# Patient Record
Sex: FEMALE | Race: WHITE | NOT HISPANIC OR LATINO | Employment: FULL TIME | URBAN - METROPOLITAN AREA
[De-identification: names, ages, dates, MRNs, and addresses within clinical notes are randomized per-mention and may not be internally consistent; named-entity substitution may affect disease eponyms.]

---

## 2017-01-09 ENCOUNTER — RADIANT APPOINTMENT (OUTPATIENT)
Dept: MAMMOGRAPHY | Facility: CLINIC | Age: 52
End: 2017-01-09
Payer: COMMERCIAL

## 2017-01-09 ENCOUNTER — OFFICE VISIT (OUTPATIENT)
Dept: OBGYN | Facility: CLINIC | Age: 52
End: 2017-01-09
Payer: COMMERCIAL

## 2017-01-09 VITALS
WEIGHT: 130 LBS | DIASTOLIC BLOOD PRESSURE: 58 MMHG | BODY MASS INDEX: 21.66 KG/M2 | HEIGHT: 65 IN | HEART RATE: 60 BPM | SYSTOLIC BLOOD PRESSURE: 100 MMHG

## 2017-01-09 DIAGNOSIS — Z12.31 VISIT FOR SCREENING MAMMOGRAM: ICD-10-CM

## 2017-01-09 DIAGNOSIS — Z79.890 POSTMENOPAUSAL HRT (HORMONE REPLACEMENT THERAPY): ICD-10-CM

## 2017-01-09 DIAGNOSIS — Z01.419 ENCOUNTER FOR GYNECOLOGICAL EXAMINATION WITHOUT ABNORMAL FINDING: Primary | ICD-10-CM

## 2017-01-09 PROCEDURE — 77063 BREAST TOMOSYNTHESIS BI: CPT | Mod: TC

## 2017-01-09 PROCEDURE — 99396 PREV VISIT EST AGE 40-64: CPT | Performed by: OBSTETRICS & GYNECOLOGY

## 2017-01-09 PROCEDURE — G0202 SCR MAMMO BI INCL CAD: HCPCS | Mod: TC

## 2017-01-09 ASSESSMENT — ANXIETY QUESTIONNAIRES
3. WORRYING TOO MUCH ABOUT DIFFERENT THINGS: NOT AT ALL
5. BEING SO RESTLESS THAT IT IS HARD TO SIT STILL: NOT AT ALL
GAD7 TOTAL SCORE: 0
1. FEELING NERVOUS, ANXIOUS, OR ON EDGE: NOT AT ALL
2. NOT BEING ABLE TO STOP OR CONTROL WORRYING: NOT AT ALL
6. BECOMING EASILY ANNOYED OR IRRITABLE: NOT AT ALL
7. FEELING AFRAID AS IF SOMETHING AWFUL MIGHT HAPPEN: NOT AT ALL

## 2017-01-09 ASSESSMENT — PATIENT HEALTH QUESTIONNAIRE - PHQ9: 5. POOR APPETITE OR OVEREATING: NOT AT ALL

## 2017-01-09 NOTE — MR AVS SNAPSHOT
"              After Visit Summary   2017    Gary Bowers    MRN: [id number]           Patient Information     Date Of Birth          1965        Visit Information        Provider Department      2017 3:00 PM Elizabeth Dominguez MD [facility]        Today's Diagnoses     Encounter for gynecological examination without abnormal finding [Z01.419]    -  1     Postmenopausal HRT (hormone replacement therapy)            Follow-ups after your visit        Who to contact     If you have questions or need follow up information about today's clinic visit or your schedule please contact [facility] directly at [phone].  Normal or non-critical lab and imaging results will be communicated to you by indicohart, letter or phone within 4 business days after the clinic has received the results. If you do not hear from us within 7 days, please contact the clinic through indicohart or phone. If you have a critical or abnormal lab result, we will notify you by phone as soon as possible.  Submit refill requests through PlasmaSi or call your pharmacy and they will forward the refill request to us. Please allow 3 business days for your refill to be completed.          Additional Information About Your Visit        MyChart Information     PlasmaSi lets you send messages to your doctor, view your test results, renew your prescriptions, schedule appointments and more. To sign up, go to [email or web address] . Click on \"Log in\" on the left side of the screen, which will take you to the Welcome page. Then click on \"Sign up Now\" on the right side of the page.     You will be asked to enter the access code listed below, as well as some personal information. Please follow the directions to create your username and password.     Your access code is: [email or web address]  Expires: 2017 11:48 AM     Your access code will  in 90 days. If you need help or a new code, please call your [place] " "clinic or [phone].        Care EveryWhere ID     This is your Care EveryWhere ID. This could be used by other organizations to access your [facility] medical records  [id number]        Your Vitals Were     Pulse Height BMI (Body Mass Index) Last Period Breastfeeding?       60 5' 4.5\" (1.638 m) 21.98 kg/m2 04/01/2012 No        Blood Pressure from Last 3 Encounters:   01/09/17 100/58   12/02/16 104/54   11/21/16 116/70    Weight from Last 3 Encounters:   01/09/17 130 lb (58.968 kg)   12/02/16 130 lb (58.968 kg)   11/21/16 129 lb 5 oz (58.656 kg)              Today, you had the following     No orders found for display         Today's Medication Changes          These changes are accurate as of: 1/9/17  4:14 PM.  If you have any questions, ask your nurse or doctor.               These medicines have changed or have updated prescriptions.        Dose/Directions    estrogen (conjugated)-medroxyPROGESTERone 0.3-1.5 MG per tablet   Commonly known as:  PREMPRO   This may have changed:  See the new instructions.   Used for:  Postmenopausal HRT (hormone replacement therapy)   Changed by:  Elizabeth Dominguez MD        Dose:  1 tablet   Take 1 tablet by mouth daily   Quantity:  90 tablet   Refills:  3            Where to get your medicines      These medications were sent to [facility] [id number] [facility] - [place], [place] - [address]  [address] [facility] [address], [place] [address]     Phone:  [phone]    - estrogen (conjugated)-medroxyPROGESTERone 0.3-1.5 MG per tablet             Primary Care Provider Office Phone # Fax #    Claudine Scott [phone]       [address] [address]        Thank you!     Thank you for choosing [facility]  for your care. Our goal is always to provide you with excellent care. Hearing back from our patients is one way we can continue to improve our services. Please take a few minutes to complete the written " survey that you may receive in the mail after your visit with us. Thank you!             Your Updated Medication List - Protect others around you: Learn how to safely use, store and throw away your medicines at www.disposemymeds.org.          This list is accurate as of: 1/9/17  4:14 PM.  Always use your most recent med list.                   Brand Name Dispense Instructions for use    estrogen (conjugated)-medroxyPROGESTERone 0.3-1.5 MG per tablet    PREMPRO    90 tablet    Take 1 tablet by mouth daily

## 2017-01-09 NOTE — PROGRESS NOTES
Gary is a 51 year old  female who presents for annual exam.     Besides routine health maintenance, she has no other health concerns today .    HPI:  The patient's PCP is Claudine Scott MD.    Patient wants to continue prempro, we plan to wean off in a few yrs  No other concerns  Due for colonoscopy      GYNECOLOGIC HISTORY:    Patient's last menstrual period was 2012.  Her current contraception method is: menopause.  She  reports that she has never smoked. She has never used smokeless tobacco.    Patient is sexually active.  STD testing offered?  Declined  Last PHQ-9 score on record =   PHQ-9 SCORE 2017   Total Score 0     Last GAD7 score on record =   LELE-7 SCORE 2017   Total Score 0     Alcohol Score = 3    HEALTH MAINTENANCE:  Cholesterol: (No results found for: CHOL   Last Mammo: 2014, Result: normal, Next Mammo: today   Pap: 2013 WNL HPV-  Colonoscopy:  NA, Result: not applicable, Next Colonoscopy: This year.  Dexa:  NA    Health maintenance updated:  yes    HISTORY:  Obstetric History       T2      TAB0   SAB0   E0   M0   L4       # Outcome Date GA Lbr Alexi/2nd Weight Sex Delivery Anes PTL Lv   2 Term 04 37w0d  7 lb 2 oz (3.232 kg) M    Y      Name: CATHI   1 Term 97   6 lb 10 oz (3.005 kg) F    Y      Name: ELENOR      Obstetric Comments    x 2 without complications       Patient Active Problem List   Diagnosis     CARDIOVASCULAR SCREENING; LDL GOAL LESS THAN 160     Past Surgical History   Procedure Laterality Date     Hc removal of tonsils,12+ y/o       Tonsils 12+y.o.     [place] teeth extracted       Biopsy/excision lymph node needle superficial  GROIN NODE      Social History   Substance Use Topics     Smoking status: Never Smoker      Smokeless tobacco: Never Used     Alcohol Use: 0.0 oz/week     0 Standard drinks or equivalent per week      Comment: 1 glass of wine or 2 /week       Problem (# of Occurrences) Relation  "(Name,Age of Onset)    Breast Cancer (1) Maternal Grandmother    DIABETES (2) Paternal Grandmother: type 2 , Maternal Aunt:  of complications in her mid 30's - type 1     HEART DISEASE (1) Father (54): MI in his mid 50's s/p CABG x 2             Current Outpatient Prescriptions   Medication Sig     estrogen, conjugated,-medroxyPROGESTERone (PREMPRO) 0.3-1.5 MG per tablet Take 1 tablet by mouth daily     [DISCONTINUED] PREMPRO 0.3-1.5 MG per tablet TAKE ONE TABLET BY MOUTH ONE TIME DAILY     No current facility-administered medications for this visit.     Allergies   Allergen Reactions     Amoxicillin GI Disturbance     Penicillins        Past medical, surgical, social and family histories were reviewed and updated in EPIC.    ROS:   12 point review of systems negative other than symptoms noted below.    EXAM:  /58 mmHg  Pulse 60  Ht 5' 4.5\" (1.638 m)  Wt 130 lb (58.968 kg)  BMI 21.98 kg/m2  LMP 2012  Breastfeeding? No   BMI: Body mass index is 21.98 kg/(m^2).    PHYSICAL EXAM:  Constitutional:  Appearance: Well nourished, well developed, alert, in no acute distress  Neck:  Lymph Nodes:  No lymphadenopathy present    Thyroid:  Gland size normal, nontender, no nodules or masses present  on palpation  Chest:  Respiratory Effort:  Breathing unlabored  Cardiovascular:    Heart: Auscultation:  Regular rate, normal rhythm, no murmurs present  Breasts: Inspection of Breasts:  No lymphadenopathy present    Palpation of Breasts and Axillae:  No masses present on palpation, no  breast tenderness    Axillary Lymph Nodes:  No lymphadenopathy present  Gastrointestinal:   Abdominal Examination:  Abdomen nontender to palpation, tone normal without rigidity or guarding, no masses present, umbilicus without lesions   Liver and Spleen:  No hepatomegaly present, liver nontender to palpation    Hernias:  No hernias present  Lymphatic: Lymph Nodes:  No other lymphadenopathy present  Skin:  General Inspection:  No " rashes present, no lesions present, no areas of  discoloration    Genitalia and Groin:  No rashes present, no lesions present, no areas of  discoloration, no masses present  Neurologic/Psychiatric:    Mental Status:  Oriented X3     Pelvic Exam:  External Genitalia:     Normal appearance for age, no discharge present, no tenderness present, no inflammatory lesions present, color normal  Vagina:     Normal vaginal vault without central or paravaginal defects, no discharge present, no inflammatory lesions present, no masses present  Bladder:     Nontender to palpation  Urethra:   Urethral Body:  Urethra palpation normal, urethra structural support normal   Urethral Meatus:  No erythema or lesions present  Cervix:     Appearance healthy, no lesions present, nontender to palpation, no bleeding present  Uterus:     Nontender to palpation, no masses present, position anteflexed, mobility: normal  Adnexa:     No adnexal tenderness present, no adnexal masses present  Perineum:     Perineum within normal limits, no evidence of trauma, no rashes or skin lesions present  Anus:     Anus within normal limits, no hemorrhoids present  Inguinal Lymph Nodes:     No lymphadenopathy present  Pubic Hair:     Normal pubic hair distribution for age  Genitalia and Groin:     No rashes present, no lesions present, no areas of discoloration, no masses present    COUNSELING:   Reviewed preventive health counseling, as reflected in patient instructions       Regular exercise       Healthy diet/nutrition    BMI: Body mass index is 21.98 kg/(m^2).      ASSESSMENT:  51 year old female with satisfactory annual exam.    ICD-10-CM    1. Encounter for gynecological examination without abnormal finding [Z01.419] Z01.419    2. Postmenopausal HRT (hormone replacement therapy) Z79.890 estrogen, conjugated,-medroxyPROGESTERone (PREMPRO) 0.3-1.5 MG per tablet       PLAN:  Return 1 years  rec 3D mammos  Not due for pap this yr    Elizabeth Dominguez MD

## 2017-01-10 ASSESSMENT — PATIENT HEALTH QUESTIONNAIRE - PHQ9: SUM OF ALL RESPONSES TO PHQ QUESTIONS 1-9: 0

## 2017-01-10 ASSESSMENT — ANXIETY QUESTIONNAIRES: GAD7 TOTAL SCORE: 0

## 2018-01-24 DIAGNOSIS — Z79.890 POSTMENOPAUSAL HRT (HORMONE REPLACEMENT THERAPY): ICD-10-CM

## 2018-01-24 RX ORDER — ESTROGEN,CON/M-PROGEST ACET 0.3-1.5MG
TABLET ORAL
Qty: 28 TABLET | Refills: 0 | Status: SHIPPED | OUTPATIENT
Start: 2018-01-24 | End: 2018-02-28

## 2018-01-24 NOTE — TELEPHONE ENCOUNTER
estrogen, conjugated,-medroxyPROGESTERone (PREMPRO) 0.3-1.5 MG per tablet    Last Written Prescription Date:  1/9/17  Last Fill Quantity: 90,   # refills: 3  Last Office Visit with [facility] primary care provider:  1/9/17  Future Office visit: none    Routing refill request to provider for review/approval because:  Pt due for annual, no appointment made. One month supply sent.

## 2018-02-02 DIAGNOSIS — Z79.890 POSTMENOPAUSAL HRT (HORMONE REPLACEMENT THERAPY): ICD-10-CM

## 2018-02-05 RX ORDER — ESTROGEN,CON/M-PROGEST ACET 0.3-1.5MG
TABLET ORAL
Qty: 84 TABLET | Refills: 3 | OUTPATIENT
Start: 2018-02-05

## 2018-02-05 NOTE — TELEPHONE ENCOUNTER
PREMPRO 0.3-1.5      Last Written Prescription Date:  1/24/18  Last Fill Quantity: 28,   # refills: 0  Last Office Visit: 1/9/17  Future Office visit:   none    Pt due for annual, no appt scheduled. Pt already received one month extension. Rx denied.

## 2018-02-28 DIAGNOSIS — Z79.890 POSTMENOPAUSAL HRT (HORMONE REPLACEMENT THERAPY): ICD-10-CM

## 2018-02-28 RX ORDER — ESTROGEN,CON/M-PROGEST ACET 0.3-1.5MG
TABLET ORAL
Qty: 28 TABLET | Refills: 0 | Status: SHIPPED | OUTPATIENT
Start: 2018-02-28 | End: 2018-03-30

## 2018-02-28 NOTE — TELEPHONE ENCOUNTER
PREMPRO 0.3-1.5      Last Written Prescription Date:  1/24/18  Last Fill Quantity: 28,   # refills: 0  Last Office Visit: 1/9/17  Future Office visit:   3/26/18    Pt has appointment Refill sent.

## 2018-03-26 ENCOUNTER — RADIANT APPOINTMENT (OUTPATIENT)
Dept: MAMMOGRAPHY | Facility: CLINIC | Age: 53
End: 2018-03-26
Payer: COMMERCIAL

## 2018-03-26 ENCOUNTER — OFFICE VISIT (OUTPATIENT)
Dept: OBGYN | Facility: CLINIC | Age: 53
End: 2018-03-26
Payer: COMMERCIAL

## 2018-03-26 VITALS
HEIGHT: 65 IN | WEIGHT: 128 LBS | DIASTOLIC BLOOD PRESSURE: 52 MMHG | BODY MASS INDEX: 21.33 KG/M2 | SYSTOLIC BLOOD PRESSURE: 90 MMHG

## 2018-03-26 DIAGNOSIS — Z13.6 SCREENING FOR CARDIOVASCULAR CONDITION: ICD-10-CM

## 2018-03-26 DIAGNOSIS — Z01.419 ENCOUNTER FOR GYNECOLOGICAL EXAMINATION WITHOUT ABNORMAL FINDING: Primary | ICD-10-CM

## 2018-03-26 DIAGNOSIS — M81.0 OSTEOPOROSIS, SENILE: ICD-10-CM

## 2018-03-26 DIAGNOSIS — Z12.11 SCREEN FOR COLON CANCER: ICD-10-CM

## 2018-03-26 DIAGNOSIS — Z13.228 SCREENING FOR METABOLIC DISORDER: ICD-10-CM

## 2018-03-26 DIAGNOSIS — Z12.31 VISIT FOR SCREENING MAMMOGRAM: ICD-10-CM

## 2018-03-26 PROCEDURE — G0145 SCR C/V CYTO,THINLAYER,RESCR: HCPCS | Performed by: OBSTETRICS & GYNECOLOGY

## 2018-03-26 PROCEDURE — 99396 PREV VISIT EST AGE 40-64: CPT | Performed by: OBSTETRICS & GYNECOLOGY

## 2018-03-26 PROCEDURE — 99213 OFFICE O/P EST LOW 20 MIN: CPT | Mod: 25 | Performed by: OBSTETRICS & GYNECOLOGY

## 2018-03-26 PROCEDURE — 77067 SCR MAMMO BI INCL CAD: CPT | Mod: TC

## 2018-03-26 PROCEDURE — 87624 HPV HI-RISK TYP POOLED RSLT: CPT | Performed by: OBSTETRICS & GYNECOLOGY

## 2018-03-26 ASSESSMENT — ANXIETY QUESTIONNAIRES
2. NOT BEING ABLE TO STOP OR CONTROL WORRYING: NOT AT ALL
IF YOU CHECKED OFF ANY PROBLEMS ON THIS QUESTIONNAIRE, HOW DIFFICULT HAVE THESE PROBLEMS MADE IT FOR YOU TO DO YOUR WORK, TAKE CARE OF THINGS AT HOME, OR GET ALONG WITH OTHER PEOPLE: NOT DIFFICULT AT ALL
7. FEELING AFRAID AS IF SOMETHING AWFUL MIGHT HAPPEN: NOT AT ALL
3. WORRYING TOO MUCH ABOUT DIFFERENT THINGS: NOT AT ALL
6. BECOMING EASILY ANNOYED OR IRRITABLE: NOT AT ALL
1. FEELING NERVOUS, ANXIOUS, OR ON EDGE: NOT AT ALL
5. BEING SO RESTLESS THAT IT IS HARD TO SIT STILL: NOT AT ALL
GAD7 TOTAL SCORE: 0

## 2018-03-26 ASSESSMENT — PATIENT HEALTH QUESTIONNAIRE - PHQ9: 5. POOR APPETITE OR OVEREATING: NOT AT ALL

## 2018-03-26 NOTE — MR AVS SNAPSHOT
After Visit Summary   3/26/2018    Gary Bowers    MRN: [id number]           Patient Information     Date Of Birth          1965        Visit Information        Provider Department      3/26/2018 11:30 AM Elizabeth Dominguez MD [facility]        Today's Diagnoses     Encounter for gynecological examination without abnormal finding    -  1    Screening for cardiovascular condition        Screening for metabolic disorder        Screen for colon cancer        Osteoporosis, senile           Follow-ups after your visit        Your next 10 appointments already scheduled     Apr 02, 2018  8:30 AM CDT   LAB with [facility]   [facility] ([facility] [place])    [address] [address]  [place] [address]   [phone]           Please do not eat 10-12 hours before your appointment if you are coming in fasting for labs on lipids, cholesterol, or glucose (sugar). This does not apply to pregnant women. Water, hot tea and black coffee (with nothing added) are okay. Do not drink other fluids, diet soda or chew gum.            Apr 02, 2018  8:45 AM CDT   DX HIP/PELVIS/SPINE with WEDEXA1   [facility] ([facility] [place])    [address] [address]   [phone]           Please do not take any of the following 24 hours prior to the day of your exam: vitamins, calcium tablets, antacids.  If possible, please wear clothes without metal (snaps, zippers). A sweatsuit works well.              Who to contact     If you have questions or need follow up information about today's clinic visit or your schedule please contact [facility] directly at [phone].  Normal or non-critical lab and imaging results will be communicated to you by MyChart, letter or phone within 4 business days after the clinic has received the results. If you do not hear from us within 7 days,  "please contact the clinic through evOLED or phone. If you have a critical or abnormal lab result, we will notify you by phone as soon as possible.  Submit refill requests through evOLED or call your pharmacy and they will forward the refill request to us. Please allow 3 business days for your refill to be completed.          Additional Information About Your Visit        LightSquaredharVaybee Information     evOLED lets you send messages to your doctor, view your test results, renew your prescriptions, schedule appointments and more. To sign up, go to [email or web address] . Click on \"Log in\" on the left side of the screen, which will take you to the Welcome page. Then click on \"Sign up Now\" on the right side of the page.     You will be asked to enter the access code listed below, as well as some personal information. Please follow the directions to create your username and password.     Your access code is: [email or web address]  Expires: 2018 12:45 PM     Your access code will  in 90 days. If you need help or a new code, please call your [place] clinic or [phone].        Care EveryWhere ID     This is your Care EveryWhere ID. This could be used by other organizations to access your [place] medical records  [id number]        Your Vitals Were     Height Last Period BMI (Body Mass Index)             5' 4.5\" (1.638 m) 2012 21.63 kg/m2          Blood Pressure from Last 3 Encounters:   18 90/52   17 100/58   16 104/54    Weight from Last 3 Encounters:   18 128 lb (58.1 kg)   17 130 lb (59 kg)   16 130 lb (59 kg)              Today, you had the following     No orders found for display       Primary Care Provider Office Phone # Fax #    Claudine Scott [phone] [address] [facility] [address]        Equal Access to Services     ARNAV ALMONTE AH: Emilee Ramirez, adalberto locke, qaybrusty soto " karina ruizjayrorosana gordonDonaldnoreen ana. So [facility] [phone].    ATENCIÓN: Si habla ene, tiene a johnson disposición servicios gratuitos de asistencia lingüística. Hilaria al [phone].    We comply with applicable federal civil rights laws and [place] laws. We do not discriminate on the basis of race, color, national origin, age, disability, sex, sexual orientation, or gender identity.            Thank you!     Thank you for choosing [facility] [place]  for your care. Our goal is always to provide you with excellent care. Hearing back from our patients is one way we can continue to improve our services. Please take a few minutes to complete the written survey that you may receive in the mail after your visit with us. Thank you!             Your Updated Medication List - Protect others around you: Learn how to safely use, store and throw away your medicines at www.disposemymeds.org.          This list is accurate as of 3/26/18 12:46 PM.  Always use your most recent med list.                   Brand Name Dispense Instructions for use Diagnosis    PREMPRO 0.3-1.5 MG per tablet   Generic drug:  estrogen (conjugated)-medroxyPROGESTERone     28 tablet    TAKE 1 TABLET BY MOUTH DAILY    Postmenopausal HRT (hormone replacement therapy)

## 2018-03-26 NOTE — PROGRESS NOTES
Gary is a 53 year old  female who presents for annual exam.     Besides routine health maintenance, she has no other health concerns today .    HPI:  The patient has no PCP on file.   Former patient of Dr Phillip  Had osteoporosis on dexa in 2013  -3.1 in her spine but hips were still mild, not on meds  Takes some vit d  No history of fractures  Lifts weight and runs for exercise  Denies bone pain          GYNECOLOGIC HISTORY:    Patient's last menstrual period was 2012.  Her current contraception method is: menopause.  She  reports that she has never smoked. She has never used smokeless tobacco.    Patient is sexually active.  STD testing offered?  Declined  Last PHQ-9 score on record =   PHQ-9 SCORE 3/26/2018   Total Score 0     Last GAD7 score on record =   LELE-7 SCORE 3/26/2018   Total Score 0     Alcohol Score = 3    HEALTH MAINTENANCE:  Cholesterol:   Last Mammo: 17, Result: normal, Next Mammo: today   Pap: 13 NIL HPV Neg  Colonoscopy:  Never, Result: not applicable, Next Colonoscopy: Will place referall  Dexa:  13   Osteoporosis in spine   -3.1    Health maintenance updated:  yes    HISTORY:  Obstetric History       T2      L4     SAB0   TAB0   Ectopic0   Multiple0   Live Births2       # Outcome Date GA Lbr Alexi/2nd Weight Sex Delivery Anes PTL Lv   2 Term 04 37w0d  7 lb 2 oz (3.232 kg) M    GINA      Name: CATHI   1 Term 97   6 lb 10 oz (3.005 kg) F    GINA      Name: LORI      Obstetric Comments    x 2 without complications       Patient Active Problem List   Diagnosis     CARDIOVASCULAR SCREENING; LDL GOAL LESS THAN 160     Past Surgical History:   Procedure Laterality Date     BIOPSY/EXCISION LYMPH NODE NEEDLE SUPERFICIAL  GROIN NODE     HC REMOVAL OF TONSILS,12+ Y/O      Tonsils 12+y.o.     [place] teeth extracted        Social History   Substance Use Topics     Smoking status: Never Smoker     Smokeless tobacco: Never Used     Alcohol use  "0.0 oz/week     0 Standard drinks or equivalent per week      Comment: 1 glass of wine or 2 /week       Problem (# of Occurrences) Relation (Name,Age of Onset)    Breast Cancer (1) Maternal Grandmother    DIABETES (2) Paternal Grandmother: type 2 , Maternal Aunt:  of complications in her mid 30's - type 1     HEART DISEASE (1) Father (54): MI in his mid 50's s/p CABG x 2             Current Outpatient Prescriptions   Medication Sig     PREMPRO 0.3-1.5 MG per tablet TAKE 1 TABLET BY MOUTH DAILY     No current facility-administered medications for this visit.      Allergies   Allergen Reactions     Amoxicillin GI Disturbance     Penicillins        Past medical, surgical, social and family histories were reviewed and updated in EPIC.    ROS:   12 point review of systems negative other than symptoms noted below.  Gastrointestinal: Blood in Stools    EXAM:  BP 90/52  Ht 5' 4.5\" (1.638 m)  Wt 128 lb (58.1 kg)  LMP 2012  BMI 21.63 kg/m2   BMI: Body mass index is 21.63 kg/(m^2).    PHYSICAL EXAM:  Constitutional:  Appearance: Well nourished, well developed, alert, in no acute distress  Neck:  Lymph Nodes:  No lymphadenopathy present    Thyroid:  Gland size normal, nontender, no nodules or masses present  on palpation  Chest:  Respiratory Effort:  Breathing unlabored  Cardiovascular:    Heart: Auscultation:  Regular rate, normal rhythm, no murmurs present  Breasts: Inspection of Breasts:  No lymphadenopathy present., Palpation of Breasts and Axillae:  No masses present on palpation, no breast tenderness., Axillary Lymph Nodes:  No lymphadenopathy present. and No nodularity, asymmetry or nipple discharge bilaterally.  Gastrointestinal:   Abdominal Examination:  Abdomen nontender to palpation, tone normal without rigidity or guarding, no masses present, umbilicus without lesions   Liver and Spleen:  No hepatomegaly present, liver nontender to palpation    Hernias:  No hernias present  Lymphatic: Lymph Nodes:  No " other lymphadenopathy present  Skin:  General Inspection:  No rashes present, no lesions present, no areas of  discoloration    Genitalia and Groin:  No rashes present, no lesions present, no areas of  discoloration, no masses present  Neurologic/Psychiatric:    Mental Status:  Oriented X3     Pelvic Exam:  External Genitalia:     Normal appearance for age, no discharge present, no tenderness present, no inflammatory lesions present, color normal  Vagina:     Normal vaginal vault without central or paravaginal defects, no discharge present, no inflammatory lesions present, no masses present  Bladder:     Nontender to palpation  Urethra:   Urethral Body:  Urethra palpation normal, urethra structural support normal   Urethral Meatus:  No erythema or lesions present  Cervix:     Appearance healthy, no lesions present, nontender to palpation, no bleeding present  Uterus:     Uterus: firm, normal sized and nontender, midplane in position.   Adnexa:     No adnexal tenderness present, no adnexal masses present  Perineum:     Perineum within normal limits, no evidence of trauma, no rashes or skin lesions present  Anus:     Anus within normal limits, no hemorrhoids present  Inguinal Lymph Nodes:     No lymphadenopathy present  Pubic Hair:     Normal pubic hair distribution for age  Genitalia and Groin:     No rashes present, no lesions present, no areas of discoloration, no masses present      COUNSELING:   Reviewed preventive health counseling, as reflected in patient instructions       Osteoporosis Prevention/Bone Health    BMI: Body mass index is 21.63 kg/(m^2).      ASSESSMENT:  53 year old female with satisfactory annual exam.    ICD-10-CM    1. Encounter for gynecological examination without abnormal finding Z01.419 Pap imaged thin layer screen with HPV - recommended age 30 - 65     HPV High Risk Types DNA Cervical   2. Screening for cardiovascular condition Z13.6 Lipid panel reflex to direct LDL Fasting   3. Screening  for metabolic disorder Z13.228 Comprehensive metabolic panel   4. Screen for colon cancer Z12.11 GASTROENTEROLOGY ADULT REF PROCEDURE ONLY Ti[facility] Georges [phone]; No Provider Preference   5. Osteoporosis, senile M81.0 DX Hip/Pelvis/Spine     Vitamin D Deficiency     TSH     Parathyroid Hormone Intact       PLAN:  We discussed osteoporosis and possible causes of early onset  Needs labs to check for treatable causes   Will return for fasting labs  Needs to repeat dexa scan since it has been several yrs    Needs colonoscopy since sister had polyps  Pap and hpv q 5 years, done today  Mammogram     Elizabeth Dominguez MD

## 2018-03-26 NOTE — LETTER
April 3, 2018    Gary Bowers  [address] [place] [address]    Dear Gary,  We are happy to inform you that your PAP smear result from 3/26/18 is normal.  We are now able to do a follow up test on PAP smears. The DNA test is for HPV (Human Papilloma Virus). Cervical cancer is closely linked with certain types of HPV. Your result showed no evidence of high risk HPV.  Therefore we recommend you return in 5 years for your next pap smear and HPV test.  You will still need to return to the clinic every year for an annual exam and other preventive tests.  Please contact the clinic at [phone] with any questions.  Sincerely,    Elizabeth Dominguez MD/richard

## 2018-03-27 ASSESSMENT — ANXIETY QUESTIONNAIRES: GAD7 TOTAL SCORE: 0

## 2018-03-27 ASSESSMENT — PATIENT HEALTH QUESTIONNAIRE - PHQ9: SUM OF ALL RESPONSES TO PHQ QUESTIONS 1-9: 0

## 2018-03-28 LAB
COPATH REPORT: NORMAL
PAP: NORMAL

## 2018-03-30 DIAGNOSIS — Z79.890 POSTMENOPAUSAL HRT (HORMONE REPLACEMENT THERAPY): ICD-10-CM

## 2018-03-30 LAB
FINAL DIAGNOSIS: NORMAL
HPV HR 12 DNA CVX QL NAA+PROBE: NEGATIVE
HPV16 DNA SPEC QL NAA+PROBE: NEGATIVE
HPV18 DNA SPEC QL NAA+PROBE: NEGATIVE
SPECIMEN DESCRIPTION: NORMAL
SPECIMEN SOURCE CVX/VAG CYTO: NORMAL

## 2018-04-02 ENCOUNTER — RADIANT APPOINTMENT (OUTPATIENT)
Dept: BONE DENSITY | Facility: CLINIC | Age: 53
End: 2018-04-02
Payer: COMMERCIAL

## 2018-04-02 DIAGNOSIS — Z13.6 SCREENING FOR CARDIOVASCULAR CONDITION: ICD-10-CM

## 2018-04-02 DIAGNOSIS — M81.0 OSTEOPOROSIS, SENILE: ICD-10-CM

## 2018-04-02 DIAGNOSIS — Z13.228 SCREENING FOR METABOLIC DISORDER: ICD-10-CM

## 2018-04-02 LAB
ALBUMIN SERPL-MCNC: 3.7 G/DL (ref 3.4–5)
ALP SERPL-CCNC: 69 U/L (ref 40–150)
ALT SERPL W P-5'-P-CCNC: 20 U/L (ref 0–50)
ANION GAP SERPL CALCULATED.3IONS-SCNC: 5 MMOL/L (ref 3–14)
AST SERPL W P-5'-P-CCNC: 22 U/L (ref 0–45)
BILIRUB SERPL-MCNC: 0.3 MG/DL (ref 0.2–1.3)
BUN SERPL-MCNC: 13 MG/DL (ref 7–30)
CALCIUM SERPL-MCNC: 8.9 MG/DL (ref 8.5–10.1)
CHLORIDE SERPL-SCNC: 107 MMOL/L (ref 94–109)
CHOLEST SERPL-MCNC: 210 MG/DL
CO2 SERPL-SCNC: 29 MMOL/L (ref 20–32)
CREAT SERPL-MCNC: 0.81 MG/DL (ref 0.52–1.04)
DEPRECATED CALCIDIOL+CALCIFEROL SERPL-MC: 37 UG/L (ref 20–75)
GFR SERPL CREATININE-BSD FRML MDRD: 74 ML/MIN/1.7M2
GLUCOSE SERPL-MCNC: 89 MG/DL (ref 70–99)
HDLC SERPL-MCNC: 54 MG/DL
LDLC SERPL CALC-MCNC: 122 MG/DL
NONHDLC SERPL-MCNC: 156 MG/DL
POTASSIUM SERPL-SCNC: 4.2 MMOL/L (ref 3.4–5.3)
PROT SERPL-MCNC: 6.6 G/DL (ref 6.8–8.8)
PTH-INTACT SERPL-MCNC: 47 PG/ML (ref 18–80)
SODIUM SERPL-SCNC: 141 MMOL/L (ref 133–144)
TRIGL SERPL-MCNC: 169 MG/DL
TSH SERPL DL<=0.005 MIU/L-ACNC: 3.93 MU/L (ref 0.4–4)

## 2018-04-02 PROCEDURE — 83970 ASSAY OF PARATHORMONE: CPT | Performed by: OBSTETRICS & GYNECOLOGY

## 2018-04-02 PROCEDURE — 84443 ASSAY THYROID STIM HORMONE: CPT | Performed by: OBSTETRICS & GYNECOLOGY

## 2018-04-02 PROCEDURE — 77080 DXA BONE DENSITY AXIAL: CPT | Performed by: OBSTETRICS & GYNECOLOGY

## 2018-04-02 PROCEDURE — 36415 COLL VENOUS BLD VENIPUNCTURE: CPT | Performed by: OBSTETRICS & GYNECOLOGY

## 2018-04-02 PROCEDURE — 82306 VITAMIN D 25 HYDROXY: CPT | Performed by: OBSTETRICS & GYNECOLOGY

## 2018-04-02 PROCEDURE — 80053 COMPREHEN METABOLIC PANEL: CPT | Performed by: OBSTETRICS & GYNECOLOGY

## 2018-04-02 PROCEDURE — 80061 LIPID PANEL: CPT | Performed by: OBSTETRICS & GYNECOLOGY

## 2018-04-03 RX ORDER — ESTROGEN,CON/M-PROGEST ACET 0.3-1.5MG
TABLET ORAL
Qty: 90 TABLET | Refills: 3 | Status: SHIPPED | OUTPATIENT
Start: 2018-04-03 | End: 2019-03-08

## 2018-04-03 NOTE — TELEPHONE ENCOUNTER
Pt calling again for refill, she took her last tablet yesterday and is now completely out. She had her annual exam on 3/26/2018 with Dr. Dominguez and per pt there was no discussion for her to stop taking the Rx. There is no mention in her office visit note if pt is to continue or discontinue Rx. Routing to Dr. Dominguez. Ok to fill for the yr? Rx & pharmacy pended.      PREMPRO 0.3-1.5 MG per tablet-take 1 tab PO daily      Last Written Prescription Date:  2/28/2018  Last Fill Quantity: 28 tabs,   # refills: 0  Last Office Visit with [facility] primary care provider:  3/26/2018-Annual with Dr. Dominguez  Future Office visit: None

## 2018-04-04 DIAGNOSIS — Z79.890 POSTMENOPAUSAL HRT (HORMONE REPLACEMENT THERAPY): ICD-10-CM

## 2018-04-04 RX ORDER — ESTROGEN,CON/M-PROGEST ACET 0.3-1.5MG
TABLET ORAL
Qty: 28 TABLET | Refills: 0 | OUTPATIENT
Start: 2018-04-04

## 2018-04-04 NOTE — TELEPHONE ENCOUNTER
PREMPRO 0.3-1.5 MG per tablet    Last Written Prescription Date:  4/3/18  Last Fill Quantity: 90,   # refills: 3  Last Office Visit with [facility] primary care provider:  3/26/18  Future Office visit: none    Routing refill request to provider for review/approval because:  Refill denied. Duplicate

## 2018-04-23 ENCOUNTER — OFFICE VISIT (OUTPATIENT)
Dept: OBGYN | Facility: CLINIC | Age: 53
End: 2018-04-23
Payer: COMMERCIAL

## 2018-04-23 VITALS
DIASTOLIC BLOOD PRESSURE: 64 MMHG | HEIGHT: 65 IN | SYSTOLIC BLOOD PRESSURE: 90 MMHG | BODY MASS INDEX: 21.33 KG/M2 | WEIGHT: 128 LBS

## 2018-04-23 DIAGNOSIS — M81.0 OSTEOPOROSIS, SENILE: Primary | ICD-10-CM

## 2018-04-23 PROCEDURE — 99213 OFFICE O/P EST LOW 20 MIN: CPT | Performed by: OBSTETRICS & GYNECOLOGY

## 2018-04-23 NOTE — PROGRESS NOTES
SUBJECTIVE:                                                   Gary Bowers is a 53 year old female who presents to clinic today for the following health issue(s):  Patient presents with:  Consult: Dexa F/U on 18     HPI:  Patient had known osteoporosis on prior dexa done on different machine in .  Spine was -3.1 at that time  Now she is -2.2 for her average dexa spine score.  However L1 was actually -2.5.   These numbers look better than 2013 but difficult to compare since different machine  Patient exercises 4 days a week and 4 dairy /day  Often forgets her Vit D  We checked her labs recently  Vit D was 37   Normal tsh and pth  No bone pain or history of fracture  Family history + osteoporosis in her mom    Patient's last menstrual period was 2012..   Patient is sexually active, .  Using menopause for contraception.    reports that she has never smoked. She has never used smokeless tobacco.    STD testing offered?  Declined    Health maintenance updated:  yes    Today's PHQ-2 Score:   PHQ-2 (  [employer]) 2016   Q1: Little interest or pleasure in doing things 0   Q2: Feeling down, depressed or hopeless 0   PHQ-2 Score 0     Today's PHQ-9 Score:   PHQ-9 SCORE 3/26/2018   Total Score 0     Today's LELE-7 Score:   LELE-7 SCORE 3/26/2018   Total Score 0       Problem list and histories reviewed & adjusted, as indicated.  Additional history: as documented.    Patient Active Problem List   Diagnosis     CARDIOVASCULAR SCREENING; LDL GOAL LESS THAN 160     Past Surgical History:   Procedure Laterality Date     BIOPSY/EXCISION LYMPH NODE NEEDLE SUPERFICIAL  GROIN NODE     HC REMOVAL OF TONSILS,12+ Y/O      Tonsils 12+y.o.     [place] teeth extracted        Social History   Substance Use Topics     Smoking status: Never Smoker     Smokeless tobacco: Never Used     Alcohol use 0.0 oz/week     0 Standard drinks or equivalent per week      Comment: 1 glass of wine or 2 /week       Problem (# of  "Occurrences) Relation (Name,Age of Onset)    Breast Cancer (1) Maternal Grandmother    DIABETES (2) Paternal Grandmother: type 2 , Maternal Aunt:  of complications in her mid 30's - type 1     HEART DISEASE (1) Father (54): MI in his mid 50's s/p CABG x 2             Current Outpatient Prescriptions   Medication Sig     PREMPRO 0.3-1.5 MG per tablet TAKE 1 TABLET BY MOUTH DAILY     No current facility-administered medications for this visit.      Allergies   Allergen Reactions     Amoxicillin GI Disturbance     Penicillins        ROS:  12 point review of systems negative other than symptoms noted below.    OBJECTIVE:     BP 90/64  Ht 5' 4.5\" (1.638 m)  Wt 128 lb (58.1 kg)  LMP 2012  BMI 21.63 kg/m2  Body mass index is 21.63 kg/(m^2).    Exam:  Constitutional:  Appearance: Well nourished, well developed alert, in no acute distress  Chest:  Respiratory Effort:  Breathing unlabored  Skin:General Inspection:  No rashes present, no lesions present, no areas of discoloration   Neurologic/Psychiatric:  Mental Status:  Oriented X3      In-Clinic Test Results:  No results found for this or any previous visit (from the past 24 hour(s)).    ASSESSMENT/PLAN:                                                        ICD-10-CM    1. Osteoporosis, senile M81.0        There are no Patient Instructions on file for this visit.    We reviewed her lab results today and compared her dexa scan numbers from  and   Went thru her family history and bone risk factors  Patient is hopeful that she can avoid medication for how  We will have her increase her Vit D intake and try to set reminder to take it  Plan to repeat dexa q 2 yrs  Discussed avoiding traumatic risks to her spine  Discussed that frax calculation doesn't apply to osteoporosis in spine   She is currently on hormone replacement therapy so that is probably helping her density so that may account for some of the improvement in numbers we have seen.       Elizabeth CARRERA" MD Alberto  [facility]

## 2018-04-23 NOTE — MR AVS SNAPSHOT
"              After Visit Summary   4/23/2018    Gary Bowers    MRN: [id number]           Patient Information     Date Of Birth          1965        Visit Information        Provider Department      4/23/2018 10:50 AM Elizabeth Dominguez MD [facility]        Today's Diagnoses     Osteoporosis, senile    -  1       Follow-ups after your visit        Your next 10 appointments already scheduled     Apr 30, 2018   Procedure with Grisel Lemus MD   [facility] ([facility])    [address]  [place] [id number] [phone]           [facility] is located at [address] [place]              Who to contact     If you have questions or need follow up information about today's clinic visit or your schedule please contact [facility] directly at [phone].  Normal or non-critical lab and imaging results will be communicated to you by MyChart, letter or phone within 4 business days after the clinic has received the results. If you do not hear from us within 7 days, please contact the clinic through [employer] [facility] or phone. If you have a critical or abnormal lab result, we will notify you by phone as soon as possible.  Submit refill requests through [employer] or call your pharmacy and they will forward the refill request to us. Please allow 3 business days for your refill to be completed.          Additional Information About Your Visit        MyChart Information     [employer] lets you send messages to your doctor, view your test results, renew your prescriptions, schedule appointments and more. To sign up, go to [email or web address] . Click on \"Log in\" on the left side of the screen, which will take you to the Welcome page. Then click on \"Sign up Now\" on the right side of the page.     You will be asked to enter the access code listed below, as well as some personal information. Please follow the directions to " "create your username and password.     Your access code is: [email or web address]  Expires: 2018 12:45 PM     Your access code will  in 90 days. If you need help or a new code, please call your [place] clinic or [phone].        Care EveryWhere ID     This is your Care EveryWhere ID. This could be used by other organizations to access your [place] medical records  [id number]        Your Vitals Were     Height Last Period BMI (Body Mass Index)             5' 4.5\" (1.638 m) 2012 21.63 kg/m2          Blood Pressure from Last 3 Encounters:   18 90/64   18 90/52   17 100/58    Weight from Last 3 Encounters:   18 128 lb (58.1 kg)   18 128 lb (58.1 kg)   17 130 lb (59 kg)              Today, you had the following     No orders found for display       Primary Care Provider Office Phone # Fax #    Claudine Scott [phone]       [address] [place] [address]        Equal Access to Services     ARNAV ALMONTE AH: Hadii jerome kamarao Sosahara, waaxda luqadaha, qaybta kaalmada adeegyada, rusty perez. So [facility] [phone].    ATENCIÓN: Si habla español, tiene a johnson disposición servicios gratuitos de asistencia lingüística. Llame al [phone].    We comply with applicable federal civil rights laws and [place] laws. We do not discriminate on the basis of race, color, national origin, age, disability, sex, sexual orientation, or gender identity.            Thank you!     Thank you for choosing [facility] FOR [facility]  for your care. Our goal is always to provide you with excellent care. Hearing back from our patients is one way we can continue to improve our services. Please take a few minutes to complete the written survey that you may receive in the mail after your visit with us. Thank you!             Your Updated Medication List - Protect others around you: Learn how to safely use, store and throw " away your medicines at www.disposemymeds.org.          This list is accurate as of 4/23/18 11:11 AM.  Always use your most recent med list.                   Brand Name Dispense Instructions for use Diagnosis    PREMPRO 0.3-1.5 MG per tablet   Generic drug:  estrogen (conjugated)-medroxyPROGESTERone     90 tablet    TAKE 1 TABLET BY MOUTH DAILY    Postmenopausal HRT (hormone replacement therapy)

## 2018-04-30 ENCOUNTER — HOSPITAL ENCOUNTER (OUTPATIENT)
Facility: CLINIC | Age: 53
Discharge: HOME | End: 2018-04-30
Attending: COLON & RECTAL SURGERY | Admitting: COLON & RECTAL SURGERY
Payer: COMMERCIAL

## 2018-04-30 ENCOUNTER — SURGERY (OUTPATIENT)
Age: 53
End: 2018-04-30

## 2018-04-30 VITALS
SYSTOLIC BLOOD PRESSURE: 101 MMHG | OXYGEN SATURATION: 96 % | DIASTOLIC BLOOD PRESSURE: 73 MMHG | RESPIRATION RATE: 20 BRPM

## 2018-04-30 LAB — COLONOSCOPY: NORMAL

## 2018-04-30 PROCEDURE — 45378 DIAGNOSTIC COLONOSCOPY: CPT | Performed by: COLON & RECTAL SURGERY

## 2018-04-30 PROCEDURE — G0121 COLON CA SCRN NOT HI RSK IND: HCPCS | Performed by: COLON & RECTAL SURGERY

## 2018-04-30 PROCEDURE — 25000128 H RX IP 250 OP 636: Performed by: COLON & RECTAL SURGERY

## 2018-04-30 PROCEDURE — G0500 MOD SEDAT ENDO SERVICE >5YRS: HCPCS | Performed by: COLON & RECTAL SURGERY

## 2018-04-30 RX ORDER — FENTANYL CITRATE 50 UG/ML
INJECTION, SOLUTION INTRAMUSCULAR; INTRAVENOUS PRN
Status: DISCONTINUED | OUTPATIENT
Start: 2018-04-30 | End: 2018-04-30 | Stop reason: HOSPADM

## 2018-04-30 RX ORDER — LIDOCAINE 40 MG/G
CREAM TOPICAL
Status: DISCONTINUED | OUTPATIENT
Start: 2018-04-30 | End: 2018-04-30 | Stop reason: HOSPADM

## 2018-04-30 RX ORDER — ONDANSETRON 2 MG/ML
4 INJECTION INTRAMUSCULAR; INTRAVENOUS
Status: DISCONTINUED | OUTPATIENT
Start: 2018-04-30 | End: 2018-04-30 | Stop reason: HOSPADM

## 2018-04-30 RX ADMIN — MIDAZOLAM 1 MG: 1 INJECTION INTRAMUSCULAR; INTRAVENOUS at 10:18

## 2018-04-30 RX ADMIN — MIDAZOLAM 2 MG: 1 INJECTION INTRAMUSCULAR; INTRAVENOUS at 10:14

## 2018-04-30 RX ADMIN — FENTANYL CITRATE 100 MCG: 50 INJECTION, SOLUTION INTRAMUSCULAR; INTRAVENOUS at 10:14

## 2018-04-30 NOTE — H&P
Colon & Rectal Surgery History and Physical  Pre-Endoscopy Procedure Note    History of Present Illness   I have been asked by Dr. Dominguez to evaluate this 53 year old female for colorectal cancer screening. She denies any abdominal pain, weight loss, bleeding per rectum, or recent change in bowel habits.    Past Medical History  Diagnosis Date     JOSE I (cervical intraepithelial neoplasia I) 1991     Dysfunctional uterine bleeding      Routine gynecological examination     Dr. Steph Phillip, [facility]        Past Surgical History  Procedure Laterality Date     BIOPSY/EXCISION LYMPH NODE NEEDLE SUPERFICIAL  GROIN NODE     REMOVAL OF TONSILS,12+ Y/O      Tonsils 12+y.o.     [place] teeth extracted          Medications   Medication Sig     PREMPRO 0.3-1.5 MG per tablet TAKE 1 TABLET BY MOUTH DAILY       Allergies   Allergen Reactions     Amoxicillin GI Disturbance     Penicillins         Family History   Family history includes Breast Cancer in her maternal grandmother; DIABETES in her maternal aunt and paternal grandmother; HEART DISEASE (age of onset: 54) in her father.     Social History   She reports that she has never smoked. She has never used smokeless tobacco. She reports that she drinks alcohol. She reports that she does not use illicit drugs.    Review of Systems   Constitutional:  No fever, weight change or fatigue.    Eyes:     No dry eyes or vision changes.   Ears/Nose/Throat/Neck:  No oral ulcers, sore throat or voice change.    Cardiovascular:   No palpitations, syncope, angina or edema.   Respiratory:    No chest pain, excessive sleepiness, shortness of breath or hemoptysis.    Gastrointestinal:   No abdominal pain, nausea, vomiting, diarrhea or heartburn.    Genitourinary:   No dysuria, hematuria, urinary retention or urinary frequency.   Musculoskeletal:  No joint swelling or arthralgias.    Dermatologic:  No skin rash or other skin changes.   Neurologic:    No focal weakness or numbness.  No neuropathy.   Psychiatric:    No depression, anxiety, suicidal ideation, or paranoid ideation.   Endocrine:   No cold or heat intolerance, polydipsia, hirsutism, change in libido, or flushing.   Hematology/Lymphatic:  No bleeding or lymphadenopathy.    Allergy/Immunology:  No rhinitis or hives.     Physical Exam   Vitals:  /72, HR 64, RR 12,  last menstrual period 04/01/2012, SpO2 99 %, not currently breastfeeding.    General:  Alert and oriented to person, place and time   Airway: Normal oropharyngeal airway and neck mobility   Lungs:  Clear bilaterally   Heart:  Regular rate and rhythm   Abdomen: Soft, NT, ND, no masses   Rectal:  Perianal skin without excoriation, hemorrhoidal disease or anal fissure        Digital rectal examination reveals normal sphincter tone without masses    ASA Grade: I (normal healthy patient)      Impression: Cleared for use of conscious sedation for colorectal cancer screening    Plan: Proceed with colonoscopy     Grisel Lemus MD  [facility]  [phone]

## 2019-03-08 DIAGNOSIS — Z79.890 POSTMENOPAUSAL HRT (HORMONE REPLACEMENT THERAPY): ICD-10-CM

## 2019-03-08 RX ORDER — ESTROGEN,CON/M-PROGEST ACET 0.3-1.5MG
TABLET ORAL
Qty: 30 TABLET | Refills: 0 | Status: SHIPPED | OUTPATIENT
Start: 2019-03-08 | End: 2019-04-08

## 2019-03-08 NOTE — TELEPHONE ENCOUNTER
"Requested Prescriptions   Pending Prescriptions Disp Refills     PREMPRO 0.3-1.5 MG tablet [Pharmacy Med Name: PREMPRO 0.3 MG-1.5 MG TABLET] 84 tablet 2     Sig: TAKE 1 TABLET BY MOUTH DAILY    Hormone Replacement Therapy Passed - 3/8/2019  1:52 AM       Passed - Blood pressure under 140/90 in past 12 months    BP Readings from Last 3 Encounters:   04/30/18 101/73   04/23/18 90/64   03/26/18 90/52                Passed - Recent (12 mo) or future (30 days) visit within the authorizing provider's specialty    Patient had office visit in the last 12 months or has a visit in the next 30 days with authorizing provider or within the authorizing provider's specialty.  See \"Patient Info\" tab in inbasket, or \"Choose Columns\" in Meds & Orders section of the refill encounter.             Passed - Patient has mammogram in past 2 years on file if age 50-75       Passed - Medication is active on med list       Passed - Patient is 18 years of age or older       Passed - No active pregnancy on record       Passed - No positive pregnancy test on record in past 12 months        Medication is being filled for 1 time refill only due to:  pt is due for an annual exam   Michelle Graham RN on 3/8/2019 at 7:35 AM        "

## 2019-04-08 DIAGNOSIS — Z79.890 POSTMENOPAUSAL HRT (HORMONE REPLACEMENT THERAPY): ICD-10-CM

## 2019-04-08 RX ORDER — ESTROGEN,CON/M-PROGEST ACET 0.3-1.5MG
TABLET ORAL
Qty: 28 TABLET | Refills: 0 | Status: SHIPPED | OUTPATIENT
Start: 2019-04-08 | End: 2019-05-07

## 2019-04-08 NOTE — TELEPHONE ENCOUNTER
Due for annual 4/23/19  Refill granted x1 until seen for annual.  Will reach out to pt and send reminder via pharmacy

## 2019-04-08 NOTE — TELEPHONE ENCOUNTER
"Requested Prescriptions   Pending Prescriptions Disp Refills     PREMPRO 0.3-1.5 MG tablet [Pharmacy Med Name: PREMPRO 0.3 MG-1.5 MG TABLET] 28 tablet 0     Sig: TAKE 1 TABLET BY MOUTH EVERY DAY, DUE FOR AN ANNUAL EXAM       Hormone Replacement Therapy Passed - 4/8/2019  1:26 AM        Passed - Blood pressure under 140/90 in past 12 months     BP Readings from Last 3 Encounters:   04/30/18 101/73   04/23/18 90/64   03/26/18 90/52                 Passed - Recent (12 mo) or future (30 days) visit within the authorizing provider's specialty     Patient had office visit in the last 12 months or has a visit in the next 30 days with authorizing provider or within the authorizing provider's specialty.  See \"Patient Info\" tab in inbasket, or \"Choose Columns\" in Meds & Orders section of the refill encounter.              Passed - Patient has mammogram in past 2 years on file if age 50-75        Passed - Medication is active on med list        Passed - Patient is 18 years of age or older        Passed - No active pregnancy on record        Passed - No positive pregnancy test on record in past 12 months        Last Written Prescription Date:  3/8/19  Last Fill Quantity: 30,  # refills: 0   Last office visit: 4/23/2018 with prescribing provider:  Elizabeth Dominguez   Future Office Visit: none      "

## 2019-05-04 DIAGNOSIS — Z79.890 POSTMENOPAUSAL HRT (HORMONE REPLACEMENT THERAPY): ICD-10-CM

## 2019-05-06 RX ORDER — ESTROGEN,CON/M-PROGEST ACET 0.3-1.5MG
TABLET ORAL
Qty: 28 TABLET | Refills: 0 | OUTPATIENT
Start: 2019-05-06

## 2019-05-06 NOTE — TELEPHONE ENCOUNTER
"Requested Prescriptions   Pending Prescriptions Disp Refills     PREMPRO 0.3-1.5 MG tablet [Pharmacy Med Name: PREMPRO 0.3 MG-1.5 MG TABLET] 28 tablet 0     Sig: TAKE 1 TABLET BY MOUTH EVERY DAY, DUE FOR AN ANNUAL EXAM       Hormone Replacement Therapy Failed - 5/4/2019 12:23 AM        Failed - Blood pressure under 140/90 in past 12 months     BP Readings from Last 3 Encounters:   04/30/18 101/73   04/23/18 90/64   03/26/18 90/52                 Failed - Recent (12 mo) or future (30 days) visit within the authorizing provider's specialty     Patient had office visit in the last 12 months or has a visit in the next 30 days with authorizing provider or within the authorizing provider's specialty.  See \"Patient Info\" tab in inbasket, or \"Choose Columns\" in Meds & Orders section of the refill encounter.              Passed - Patient has mammogram in past 2 years on file if age 50-75        Passed - Medication is active on med list        Passed - Patient is 18 years of age or older        Passed - No active pregnancy on record        Passed - No positive pregnancy test on record in past 12 months      Last Written Prescription Date:  4/8/19  Last Fill Quantity: 28,  # refills: 0   Last office visit: 4/23/2018 with prescribing provider:  Alberto   Future Office Visit:    Pt due for annual, no appt scheduled. Pt already received one month extension. Rx denied.   "

## 2019-05-07 NOTE — TELEPHONE ENCOUNTER
Next 5 appointments (look out 90 days)    Jun 12, 2019  2:50 PM CDT  PHYSICAL with Elizabeth Dominguez MD  [facility] ([facility] [address] [place] [id number]  [phone]        Prescription approved per [facility] Refill Protocol.  Michelle Graham RN on 5/7/2019 at 12:46 PM

## 2019-05-07 NOTE — TELEPHONE ENCOUNTER
Annual scheduled for 6/12/19 with Alberto.    Can we send refill to pharmacy?    Please notify patient.

## 2019-06-01 DIAGNOSIS — Z79.890 POSTMENOPAUSAL HRT (HORMONE REPLACEMENT THERAPY): ICD-10-CM

## 2019-06-03 NOTE — TELEPHONE ENCOUNTER
"Requested Prescriptions   Pending Prescriptions Disp Refills     estrogen conj-medroxyPROGESTERone (PREMPRO) 0.3-1.5 MG tablet [Pharmacy Med Name: PREMPRO 0.3 MG-1.5 MG TABLET] 28 tablet 0     Sig: TAKE 1 TABLET BY MOUTH EVERY DAY, DUE FOR AN ANNUAL EXAM       Hormone Replacement Therapy Failed - 6/1/2019 12:19 AM        Failed - Blood pressure under 140/90 in past 12 months     BP Readings from Last 3 Encounters:   04/30/18 101/73   04/23/18 90/64   03/26/18 90/52                 Passed - Recent (12 mo) or future (30 days) visit within the authorizing provider's specialty     Patient had office visit in the last 12 months or has a visit in the next 30 days with authorizing provider or within the authorizing provider's specialty.  See \"Patient Info\" tab in inbasket, or \"Choose Columns\" in Meds & Orders section of the refill encounter.              Passed - Patient has mammogram in past 2 years on file if age 50-75        Passed - Medication is active on med list        Passed - Patient is 18 years of age or older        Passed - No active pregnancy on record        Passed - No positive pregnancy test on record in past 12 months      Last Written Prescription Date:  5/7/19  Last Fill Quantity: 28,  # refills: 0   Last office visit: 4/23/2018 with prescribing provider:  Alberto   Future Office Visit:   Next 5 appointments (look out 90 days)    Jun 12, 2019  2:50 PM CDT  PHYSICAL with Elizabeth Dominguez MD  [facility] ([facility] [address] [place] [id number] [phone]       Refill sent as pt has appointment scheduled.   "

## 2019-06-11 NOTE — PROGRESS NOTES
Gary is a 54 year old  female who presents for annual exam.     Besides routine health maintenance, she has no other health concerns today .    HPI:  The patient's PCP is  Claudine Scott MD.     Patient has occ vulvar itching, very mild  Is on hormone replacement therapy prempro and wants to refill    Works for [employer] now      GYNECOLOGIC HISTORY:    Patient's last menstrual period was 2012.  Her current contraception method is: menopause.  She  reports that she has never smoked. She has never used smokeless tobacco.    Patient is sexually active.  STD testing offered?  Declined  Last PHQ-9 score on record =   PHQ-9 SCORE 2019   PHQ-9 Total Score 0     Last GAD7 score on record =   LELE-7 SCORE 2019   Total Score 0     Alcohol Score = 2    HEALTH MAINTENANCE:  Cholesterol: 18  Total= 210, Triglycerides=169, HDL=54, OUO=755, FBS=89, TSH=3.93    Cholesterol   Date Value Ref Range Status   2018 210 (H) <200 mg/dL Final     Comment:     Desirable:       <200 mg/dl      Last Mammo: one year ago, Result: normal, Next Mammo: today   Pap:   Lab Results   Component Value Date    PAP NIL HPV - 2018      Colonoscopy:  18, Result: normal, Next Colonoscopy:  .  Dexa:  18    Health maintenance updated:  yes    HISTORY:  OB History    Para Term  AB Living   2 2 2 0 0 4   SAB TAB Ectopic Multiple Live Births   0 0 0 0 2      # Outcome Date GA Lbr Alexi/2nd Weight Sex Delivery Anes PTL Lv   2 Term 04 37w0d  3.232 kg (7 lb 2 oz) M    GINA      Name: CATHI   1 Term 97   3.005 kg (6 lb 10 oz) F    GINA      Name: ELENOR      Obstetric Comments    x 2 without complications       Patient Active Problem List   Diagnosis     CARDIOVASCULAR SCREENING; LDL GOAL LESS THAN 160     Past Surgical History:   Procedure Laterality Date     BIOPSY/EXCISION LYMPH NODE NEEDLE SUPERFICIAL  GROIN NODE     COLONOSCOPY N/A 2018    Procedure:  "COLONOSCOPY;  colonoscopy;  Surgeon: Grisel Lemus MD;  Location:  GI     HC REMOVAL OF TONSILS,12+ Y/O      Tonsils 12+y.o.     wisdom teeth extracted        Social History     Tobacco Use     Smoking status: Never Smoker     Smokeless tobacco: Never Used   Substance Use Topics     Alcohol use: Yes     Alcohol/week: 0.0 oz     Frequency: 2-3 times a week     Drinks per session: 1 or 2     Binge frequency: Never     Comment: 1 glass of wine or 2 /week       Problem (# of Occurrences) Relation (Name,Age of Onset)    Breast Cancer (1) Maternal Grandmother    Diabetes (2) Paternal Grandmother: type 2 , Maternal Aunt:  of complications in her mid 30's - type 1     Heart Disease (1) Father (54): MI in his mid 50's s/p CABG x 2             Current Outpatient Medications   Medication Sig     estrogen conj-medroxyPROGESTERone (PREMPRO) 0.3-1.5 MG tablet TAKE 1 TABLET BY MOUTH EVERY DAY     No current facility-administered medications for this visit.      Allergies   Allergen Reactions     Amoxicillin GI Disturbance     Penicillins        Past medical, surgical, social and family histories were reviewed and updated in EPIC.    ROS:   12 point review of systems negative other than symptoms noted below.  Genitourinary: Hot Flashes    EXAM:  /70   Pulse 76   Ht 1.638 m (5' 4.5\")   Wt 57.2 kg (126 lb)   LMP 2012   BMI 21.29 kg/m     BMI: Body mass index is 21.29 kg/m .    PHYSICAL EXAM:  Constitutional:  Appearance: Well nourished, well developed, alert, in no acute distress  Neck:  Lymph Nodes:  No lymphadenopathy present    Thyroid:  Gland size normal, nontender, no nodules or masses present  on palpation  Chest:  Respiratory Effort:  Breathing unlabored  Cardiovascular:    Heart: Auscultation:  Regular rate, normal rhythm, no murmurs present  Breasts: Inspection of Breasts:  No lymphadenopathy present., Palpation of Breasts and Axillae:  No masses present on palpation, no breast tenderness., " Axillary Lymph Nodes:  No lymphadenopathy present. and No nodularity, asymmetry or nipple discharge bilaterally.  Gastrointestinal:   Abdominal Examination:  Abdomen nontender to palpation, tone normal without rigidity or guarding, no masses present, umbilicus without lesions   Liver and Spleen:  No hepatomegaly present, liver nontender to palpation    Hernias:  No hernias present  Lymphatic: Lymph Nodes:  No other lymphadenopathy present  Skin:  General Inspection:  No rashes present, no lesions present, no areas of  discoloration    Genitalia and Groin:  No rashes present, no lesions present, no areas of  discoloration, no masses present  Neurologic/Psychiatric:    Mental Status:  Oriented X3     Pelvic Exam:  External Genitalia:     Normal appearance for age, no discharge present, no tenderness present, no inflammatory lesions present, color normal  Vagina:     Normal vaginal vault without central or paravaginal defects, ATROPHIC  Bladder:     Nontender to palpation  Urethra:   Urethral Body:  Urethra palpation normal, urethra structural support normal   Urethral Meatus:  No erythema or lesions present  Cervix:     Appearance healthy, no lesions present, nontender to palpation, no bleeding present  Uterus:     Nontender to palpation, no masses present, position anteflexed, mobility: normal  Adnexa:     No adnexal tenderness present, no adnexal masses present  Perineum:     Perineum within normal limits, no evidence of trauma, no rashes or skin lesions present  Inguinal Lymph Nodes:     No lymphadenopathy present      COUNSELING:   Reviewed preventive health counseling, as reflected in patient instructions       Regular exercise       Healthy diet/nutrition    BMI: Body mass index is 21.29 kg/m .      ASSESSMENT:  54 year old female with satisfactory annual exam.    ICD-10-CM    1. Encounter for gynecological examination without abnormal finding Z01.419    2. Postmenopausal HRT (hormone replacement therapy)  Z79.890 estrogen conj-medroxyPROGESTERone (PREMPRO) 0.3-1.5 MG tablet   3. Lichen sclerosus et atrophicus L90.0        PLAN:  New diagnosis of LSA  Used mirror to show patient, not much for symptoms yet and only intermittent  rec topical otc cortisone cream    Refill prempro  Check dexa next years since getting borderline osteoporosis    Additional 15 minute spent on evaluation and mangagement of lichen sclerosis in addition to her preventive services    Elizabeth Dominguez MD

## 2019-06-12 ENCOUNTER — ANCILLARY PROCEDURE (OUTPATIENT)
Dept: MAMMOGRAPHY | Facility: CLINIC | Age: 54
End: 2019-06-12
Payer: COMMERCIAL

## 2019-06-12 ENCOUNTER — OFFICE VISIT (OUTPATIENT)
Dept: OBGYN | Facility: CLINIC | Age: 54
End: 2019-06-12
Payer: COMMERCIAL

## 2019-06-12 VITALS
BODY MASS INDEX: 20.99 KG/M2 | WEIGHT: 126 LBS | SYSTOLIC BLOOD PRESSURE: 118 MMHG | HEART RATE: 76 BPM | DIASTOLIC BLOOD PRESSURE: 70 MMHG | HEIGHT: 65 IN

## 2019-06-12 DIAGNOSIS — L90.0 LICHEN SCLEROSUS ET ATROPHICUS: ICD-10-CM

## 2019-06-12 DIAGNOSIS — Z12.31 VISIT FOR SCREENING MAMMOGRAM: ICD-10-CM

## 2019-06-12 DIAGNOSIS — Z01.419 ENCOUNTER FOR GYNECOLOGICAL EXAMINATION WITHOUT ABNORMAL FINDING: Primary | ICD-10-CM

## 2019-06-12 DIAGNOSIS — Z79.890 POSTMENOPAUSAL HRT (HORMONE REPLACEMENT THERAPY): ICD-10-CM

## 2019-06-12 PROCEDURE — 99213 OFFICE O/P EST LOW 20 MIN: CPT | Mod: 25 | Performed by: OBSTETRICS & GYNECOLOGY

## 2019-06-12 PROCEDURE — 99396 PREV VISIT EST AGE 40-64: CPT | Performed by: OBSTETRICS & GYNECOLOGY

## 2019-06-12 PROCEDURE — 77067 SCR MAMMO BI INCL CAD: CPT | Mod: TC

## 2019-06-12 SDOH — HEALTH STABILITY: MENTAL HEALTH: HOW OFTEN DO YOU HAVE 6 OR MORE DRINKS ON ONE OCCASION?: NEVER

## 2019-06-12 SDOH — HEALTH STABILITY: MENTAL HEALTH: HOW OFTEN DO YOU HAVE A DRINK CONTAINING ALCOHOL?: 2-3 TIMES A WEEK

## 2019-06-12 SDOH — HEALTH STABILITY: MENTAL HEALTH: HOW MANY STANDARD DRINKS CONTAINING ALCOHOL DO YOU HAVE ON A TYPICAL DAY?: 1 OR 2

## 2019-06-12 ASSESSMENT — ANXIETY QUESTIONNAIRES
5. BEING SO RESTLESS THAT IT IS HARD TO SIT STILL: NOT AT ALL
6. BECOMING EASILY ANNOYED OR IRRITABLE: NOT AT ALL
7. FEELING AFRAID AS IF SOMETHING AWFUL MIGHT HAPPEN: NOT AT ALL
3. WORRYING TOO MUCH ABOUT DIFFERENT THINGS: NOT AT ALL
2. NOT BEING ABLE TO STOP OR CONTROL WORRYING: NOT AT ALL
1. FEELING NERVOUS, ANXIOUS, OR ON EDGE: NOT AT ALL
IF YOU CHECKED OFF ANY PROBLEMS ON THIS QUESTIONNAIRE, HOW DIFFICULT HAVE THESE PROBLEMS MADE IT FOR YOU TO DO YOUR WORK, TAKE CARE OF THINGS AT HOME, OR GET ALONG WITH OTHER PEOPLE: NOT DIFFICULT AT ALL
GAD7 TOTAL SCORE: 0

## 2019-06-12 ASSESSMENT — PATIENT HEALTH QUESTIONNAIRE - PHQ9
SUM OF ALL RESPONSES TO PHQ QUESTIONS 1-9: 0
5. POOR APPETITE OR OVEREATING: NOT AT ALL

## 2019-06-12 ASSESSMENT — MIFFLIN-ST. JEOR: SCORE: 1164.47

## 2019-06-13 ASSESSMENT — ANXIETY QUESTIONNAIRES: GAD7 TOTAL SCORE: 0

## 2020-02-06 ENCOUNTER — VIRTUAL VISIT (OUTPATIENT)
Dept: FAMILY MEDICINE | Facility: OTHER | Age: 55
End: 2020-02-06

## 2020-02-06 NOTE — PROGRESS NOTES
"Date: 2020 08:09:50  Clinician: Patience Horton  Clinician NPI: [id number]  Patient: Gary Bowers  Patient : 1965  Patient Address: [address] [place] [address]  Patient Phone: [phone]  Visit Protocol: URI  Patient Summary:  Gary is a 54 year old ( : 1965 ) female who initiated a Visit for cold, sinus infection, or influenza. When asked the question \"Please sign me up to receive news, health information and promotions from [employer].\", Gary responded \"No\".    Gary states her symptoms started gradually 3-6 days ago.   Her symptoms consist of a cough, facial pain or pressure, myalgia, a headache, a sore throat, ear pain, chills, malaise, nasal congestion, and rhinitis. She is experiencing difficulty breathing due to nasal congestion but she is not short of breath. Gary also feels feverish but was unable to measure her temperature.   Symptom details     Nasal secretions: The color of her mucus is yellow.    Cough: Gary coughs every 5-10 minutes and her cough is not more bothersome at night. Phlegm comes into her throat when she coughs. She believes the phlegm causes the cough. The color of the phlegm is white.     Sore throat: Gary reports having mild throat pain (1-3 on a 10 point pain scale), does not have exudate on her tonsils, and can swallow liquids. The lymph nodes in her neck are not enlarged. A rash has not appeared on the skin since the sore throat started.     Facial pain or pressure: The facial pain or pressure feels worse when bending over or leaning forward.     Headache: She states the headache is moderate (4-6 on a 10 point pain scale).      Gary denies having enlarged lymph nodes, wheezing, and teeth pain. She also denies having recent facial or sinus surgery in the past 60 days, having a sinus infection within the past year, double sickening (worsening symptoms after initial improvement), and taking antibiotic medication for the symptoms.   " Precipitating events  Gary is not sure if she has been exposed to someone with strep throat. She has recently been exposed to someone with influenza. Gary has not been in close contact with any high risk individuals.   Gary has not traveled internationally in the last 14 days before the start of her symptoms.   Pertinent medical history  Gary does not get yeast infections when she takes antibiotics.   Weight: 127 lbs   Gary does not smoke or use smokeless tobacco.   Additional information as reported by the patient (free text): I have the most luck with a z-pack to help with my current conditions and I can stomach this the best.   Weight: 127 lbs    MEDICATIONS: Prempro oral, ALLERGIES: Penicillins  Clinician Response:  Dear Gary,  Based on the information provided, you have a viral upper respiratory infection, otherwise known as a cold. Symptoms vary from person to person, but can include sneezing, coughing, a runny nose, sore throat, and headache and range from mild to severe.  Unfortunately, there are no medications that can cure a cold, so treatment is focused on controlling symptoms as much as possible. Most people gradually feel better until symptoms are gone in 1-2 weeks.  Medication information  Because you have a viral infection, antibiotics will not help you get better. Treating a viral infection with antibiotics could actually make you feel worse.  Self care  The following tips will keep you as comfortable as possible while you recover:     Rest    Drink plenty of water and other liquids    Take a hot shower to loosen congestion    Use throat lozenges    Gargle with warm salt water (1/4 teaspoon of salt per 8 ounce glass of water)    Suck on frozen items such as popsicles or ice cubes    Drink hot tea with lemon and honey    Take a spoonful of honey to reduce your cough     When to seek care  Please be seen in a clinic or urgent care if new symptoms develop, or symptoms become worse.  Call 911  or go to the emergency room if you feel that your throat is closing off, you suddenly develop a rash, you are unable to swallow fluids, you are drooling, or you are having difficulty breathing.   Diagnosis: Viral URI  Diagnosis ICD: J06.9

## 2020-02-15 ENCOUNTER — OFFICE VISIT (OUTPATIENT)
Dept: URGENT CARE | Facility: URGENT CARE | Age: 55
End: 2020-02-15
Payer: COMMERCIAL

## 2020-02-15 VITALS
DIASTOLIC BLOOD PRESSURE: 68 MMHG | WEIGHT: 126 LBS | HEART RATE: 82 BPM | SYSTOLIC BLOOD PRESSURE: 114 MMHG | OXYGEN SATURATION: 97 % | RESPIRATION RATE: 16 BRPM | BODY MASS INDEX: 21.29 KG/M2 | TEMPERATURE: 99.6 F

## 2020-02-15 DIAGNOSIS — J40 BRONCHITIS: Primary | ICD-10-CM

## 2020-02-15 PROCEDURE — 99203 OFFICE O/P NEW LOW 30 MIN: CPT | Performed by: PHYSICIAN ASSISTANT

## 2020-02-15 RX ORDER — AZITHROMYCIN 250 MG/1
TABLET, FILM COATED ORAL
Qty: 6 TABLET | Refills: 0 | Status: SHIPPED | OUTPATIENT
Start: 2020-02-15 | End: 2020-09-22

## 2020-02-15 RX ORDER — FLUTICASONE PROPIONATE 50 MCG
1 SPRAY, SUSPENSION (ML) NASAL DAILY
Qty: 11.1 ML | Refills: 0 | Status: SHIPPED | OUTPATIENT
Start: 2020-02-15 | End: 2020-02-29

## 2020-02-15 NOTE — PROGRESS NOTES
"SUBJECTIVE:    Gary Bowers is a 54 year old female who presents to the clinic today with a chief complaint of cough, low-grade subjective tactile temperature for 2 week(s).  Her cough is described as nonproductive.    The patient's symptoms are moderate and stable.  Associated symptoms include congestion.  Patient is concerned that the drainage down the back of her throat from the head is draining into \"the lungs\".  The patient's symptoms are exacerbated by no particular triggers  Patient has been using ibuprofen  to improve symptoms.  Last dose of Advil was at 10 AM.  She is used over-the-counter cough drops without relief.  No known sick contacts for flu or strep.    Past Medical History:   Diagnosis Date     JOSE I (cervical intraepithelial neoplasia I) 1991     Dysfunctional uterine bleeding      Routine gynecological examination     Dr. Steph Phillip, [facility]        Current Outpatient Medications   Medication Sig Dispense Refill     azithromycin (ZITHROMAX Z-CLINTON) 250 MG tablet 2 tabs today then 1 tab daily x 4 days 6 tablet 0     fluticasone (FLONASE) 50 MCG/ACT nasal spray Spray 1 spray into both nostrils daily for 14 days 11.1 mL 0     estrogen conj-medroxyPROGESTERone (PREMPRO) 0.3-1.5 MG tablet TAKE 1 TABLET BY MOUTH EVERY DAY 90 tablet 3       Social History     Tobacco Use     Smoking status: Never Smoker     Smokeless tobacco: Never Used   Substance Use Topics     Alcohol use: Yes     Alcohol/week: 0.0 standard drinks     Frequency: 2-3 times a week     Drinks per session: 1 or 2     Binge frequency: Never     Comment: 1 glass of wine or 2 /week        ROS  Review of systems negative except as stated above.    OBJECTIVE:  /68   Pulse 82   Temp 99.6  F (37.6  C) (Oral)   Resp 16   Wt 57.2 kg (126 lb)   LMP 04/01/2012   SpO2 97%   BMI 21.29 kg/m    GENERAL APPEARANCE: healthy, alert and no distress  EYES: EOMI,  PERRL, conjunctiva clear  HENT: ear canals and TM's normal.  " Nose and mouth without ulcers, erythema or lesions  NECK: supple, nontender, no lymphadenopathy  RESP: lungs with congestion and wheezes in the lower lung fields heard to auscultation  CV: regular rates and rhythm, normal S1 S2, no murmur noted  ABDOMEN:  soft, nontender, no HSM or masses and bowel sounds normal  NEURO: Normal strength and tone, sensory exam grossly normal,  normal speech and mentation  SKIN: no suspicious lesions or rashes    ASSESSMENT:  1. Bronchitis        PLAN:  Orders Placed This Encounter     azithromycin (ZITHROMAX Z-CLINTON) 250 MG tablet     fluticasone (FLONASE) 50 MCG/ACT nasal spray       Patient Instructions   You were seen today for acute bronchitis.     Symptom management:  - Get plenty of rest  - Avoid smoking and second hand smoke  - May take tylenol or ibuprofen for fever/discomfort  - Drink plenty of non-caffeinated fluids  - Use nasal steroid spray for sinus congestion  - Albuterol inhaler may be used every 6 hours as needed for chest tightness      Reasons to be seen in the emergency room:  - Develop a fever of 100.4 or higher  - Cough changes, coughing up blood, or become short of breath  - Neck stiffness  - Chest pain  - Severe headache  - Unable to tolerate eating or drinking fluids    Otherwise, if no symptom improvement after 5 days, follow-up with your primary care provider.        Patient Education     Bronchitis, Antibiotic Treatment (Adult)    Bronchitis is an infection of the air passages (bronchial tubes) in your lungs. It often occurs when you have a cold. This illness is contagious during the first few days and is spread through the air by coughing and sneezing, or by direct contact (touching the sick person and then touching your own eyes, nose, or mouth).  Symptoms of bronchitis include cough with mucus (phlegm) and low-grade fever. Bronchitis usually lasts 7 to 14 days. Mild cases can be treated with simple home remedies. More severe infection is treated with an  antibiotic.  Home care  Follow these guidelines when caring for yourself at home:    If your symptoms are severe, rest at home for the first 2 to 3 days. When you go back to your usual activities, don't let yourself get too tired.    Don't smoke. Also stay away from secondhand smoke.    You may use over-the-counter medicines to control fever or pain, unless another medicine was prescribed. If you have chronic liver or kidney disease or have ever had a stomach ulcer or gastrointestinal bleeding, talk with your healthcare provider before using these medicines. Also talk to your provider if you are taking medicine to prevent blood clots. Aspirin should never be given to anyone younger than 18 who is ill with a viral infection or fever. It may cause severe liver or brain damage.    Your appetite may be low, so a light diet is fine. Stay well hydrated by drinking 6 to 8 glasses of fluids per day. This includes water, soft drinks, sports drinks, juices, tea, or soup. Extra fluids will help loosen mucus in your nose and lungs.    Over-the-counter cough, cold, and sore-throat medicines will not shorten the length of the illness, but they may be helpful to reduce your symptoms. Don't use decongestants if you have high blood pressure.    Finish all antibiotic medicine. Do this even if you are feeling better after only a few days.  Follow-up care  Follow up with your healthcare provider, or as advised. If you had an X-ray or ECG (electrocardiogram), a specialist will review it. You will be told of any new test results that may affect your care.  If you are age 65 or older, if you smoke, or if you have a chronic lung disease or condition that affects your immune system, ask your healthcare provider about getting a pneumococcal vaccine and a yearly flu shot (influenza vaccine).  When to seek medical advice  Call your healthcare provider right away if any of these occur:    Fever of 100.4 F (38 C) or higher, or as directed by  your healthcare provider    Coughing up more sputum    Weakness, drowsiness, headache, facial pain, ear pain, or a stiff neck  Call 911  Call 911 if any of these occur.    Coughing up blood    Weakness, drowsiness, headache, or stiff neck that get worse    Trouble breathing, wheezing, or pain with breathing  Date Last Reviewed: 6/1/2018 2000-2019 [employer]. [address] [place] [address]. All rights reserved. This information is not intended as a substitute for professional medical care. Always follow your healthcare professional's instructions.

## 2020-02-15 NOTE — PATIENT INSTRUCTIONS
You were seen today for acute bronchitis.     Symptom management:  - Get plenty of rest  - Avoid smoking and second hand smoke  - May take tylenol or ibuprofen for fever/discomfort  - Drink plenty of non-caffeinated fluids  - Use nasal steroid spray for sinus congestion  - Albuterol inhaler may be used every 6 hours as needed for chest tightness      Reasons to be seen in the emergency room:  - Develop a fever of 100.4 or higher  - Cough changes, coughing up blood, or become short of breath  - Neck stiffness  - Chest pain  - Severe headache  - Unable to tolerate eating or drinking fluids    Otherwise, if no symptom improvement after 5 days, follow-up with your primary care provider.        Patient Education     Bronchitis, Antibiotic Treatment (Adult)    Bronchitis is an infection of the air passages (bronchial tubes) in your lungs. It often occurs when you have a cold. This illness is contagious during the first few days and is spread through the air by coughing and sneezing, or by direct contact (touching the sick person and then touching your own eyes, nose, or mouth).  Symptoms of bronchitis include cough with mucus (phlegm) and low-grade fever. Bronchitis usually lasts 7 to 14 days. Mild cases can be treated with simple home remedies. More severe infection is treated with an antibiotic.  Home care  Follow these guidelines when caring for yourself at home:    If your symptoms are severe, rest at home for the first 2 to 3 days. When you go back to your usual activities, don't let yourself get too tired.    Don't smoke. Also stay away from secondhand smoke.    You may use over-the-counter medicines to control fever or pain, unless another medicine was prescribed. If you have chronic liver or kidney disease or have ever had a stomach ulcer or gastrointestinal bleeding, talk with your healthcare provider before using these medicines. Also talk to your provider if you are taking medicine to prevent blood clots.  Aspirin should never be given to anyone younger than 18 who is ill with a viral infection or fever. It may cause severe liver or brain damage.    Your appetite may be low, so a light diet is fine. Stay well hydrated by drinking 6 to 8 glasses of fluids per day. This includes water, soft drinks, sports drinks, juices, tea, or soup. Extra fluids will help loosen mucus in your nose and lungs.    Over-the-counter cough, cold, and sore-throat medicines will not shorten the length of the illness, but they may be helpful to reduce your symptoms. Don't use decongestants if you have high blood pressure.    Finish all antibiotic medicine. Do this even if you are feeling better after only a few days.  Follow-up care  Follow up with your healthcare provider, or as advised. If you had an X-ray or ECG (electrocardiogram), a specialist will review it. You will be told of any new test results that may affect your care.  If you are age 65 or older, if you smoke, or if you have a chronic lung disease or condition that affects your immune system, ask your healthcare provider about getting a pneumococcal vaccine and a yearly flu shot (influenza vaccine).  When to seek medical advice  Call your healthcare provider right away if any of these occur:    Fever of 100.4 F (38 C) or higher, or as directed by your healthcare provider    Coughing up more sputum    Weakness, drowsiness, headache, facial pain, ear pain, or a stiff neck  Call 911  Call 911 if any of these occur.    Coughing up blood    Weakness, drowsiness, headache, or stiff neck that get worse    Trouble breathing, wheezing, or pain with breathing  Date Last Reviewed: 6/1/2018 2000-2019 [employer]. [address] [place] [address]. All rights reserved. This information is not intended as a substitute for professional medical care. Always follow your healthcare professional's instructions.

## 2020-06-06 DIAGNOSIS — Z79.890 POSTMENOPAUSAL HRT (HORMONE REPLACEMENT THERAPY): ICD-10-CM

## 2020-06-08 RX ORDER — ESTROGEN,CON/M-PROGEST ACET 0.3-1.5MG
TABLET ORAL
Qty: 28 TABLET | Refills: 0 | Status: SHIPPED | OUTPATIENT
Start: 2020-06-08 | End: 2020-07-07

## 2020-06-08 NOTE — TELEPHONE ENCOUNTER
"Requested Prescriptions   Pending Prescriptions Disp Refills     estrogen conj-medroxyPROGESTERone (PREMPRO) 0.3-1.5 MG tablet [Pharmacy Med Name: PREMPRO 0.3 MG-1.5 MG TABLET] 84 tablet 3     Sig: TAKE 1 TABLET BY MOUTH EVERY DAY       Hormone Replacement Therapy Passed - 6/6/2020 12:21 AM        Passed - Blood pressure under 140/90 in past 12 months     BP Readings from Last 3 Encounters:   02/15/20 114/68   06/12/19 118/70   04/30/18 101/73                 Passed - Recent (12 mo) or future (30 days) visit within the authorizing provider's specialty     Patient has had an office visit with the authorizing provider or a provider within the authorizing providers department within the previous 12 mos or has a future within next 30 days. See \"Patient Info\" tab in inbasket, or \"Choose Columns\" in Meds & Orders section of the refill encounter.              Passed - Patient has mammogram in past 2 years on file if age 50-75        Passed - Medication is active on med list        Passed - Patient is 18 years of age or older        Passed - No active pregnancy on record        Passed - No positive pregnancy test on record in past 12 months           Refill sent  Appointment needed for further refills  Michelle Graham RN on 6/8/2020 at 8:48 AM    "

## 2020-06-30 DIAGNOSIS — Z79.890 POSTMENOPAUSAL HRT (HORMONE REPLACEMENT THERAPY): ICD-10-CM

## 2020-06-30 RX ORDER — ESTROGEN,CON/M-PROGEST ACET 0.3-1.5MG
TABLET ORAL
Qty: 28 TABLET | Refills: 0 | OUTPATIENT
Start: 2020-06-30

## 2020-06-30 NOTE — TELEPHONE ENCOUNTER
"Requested Prescriptions   Pending Prescriptions Disp Refills     PREMPRO 0.3-1.5 MG tablet [Pharmacy Med Name: PREMPRO 0.3 MG-1.5 MG TABLET] 28 tablet 0     Sig: TAKE 1 TABLET BY MOUTH EVERY DAY       Hormone Replacement Therapy Failed - 6/30/2020 12:25 AM        Failed - Recent (12 mo) or future (30 days) visit within the authorizing provider's specialty     Patient has had an office visit with the authorizing provider or a provider within the authorizing providers department within the previous 12 mos or has a future within next 30 days. See \"Patient Info\" tab in inbasket, or \"Choose Columns\" in Meds & Orders section of the refill encounter.              Passed - Blood pressure under 140/90 in past 12 months     BP Readings from Last 3 Encounters:   02/15/20 114/68   06/12/19 118/70   04/30/18 101/73                 Passed - Patient has mammogram in past 2 years on file if age 50-75        Passed - Medication is active on med list        Passed - Patient is 18 years of age or older        Passed - No active pregnancy on record        Passed - No positive pregnancy test on record in past 12 months           Last Written Prescription Date:  6/8/20  Last Fill Quantity: 28,  # refills: 0   Last office visit: 6/12/2019 with prescribing provider:  Dr Dominguez   Future Office Visit:  None  Denied  Appointment needed for further refills  Michelle Graham RN on 6/30/2020 at 12:01 PM    "

## 2020-07-07 DIAGNOSIS — Z79.890 POSTMENOPAUSAL HRT (HORMONE REPLACEMENT THERAPY): ICD-10-CM

## 2020-07-07 RX ORDER — ESTROGEN,CON/M-PROGEST ACET 0.3-1.5MG
TABLET ORAL
Qty: 28 TABLET | Refills: 0 | OUTPATIENT
Start: 2020-07-07

## 2020-07-07 RX ORDER — ESTROGEN,CON/M-PROGEST ACET 0.3-1.5MG
TABLET ORAL
Qty: 84 TABLET | Refills: 0 | Status: SHIPPED | OUTPATIENT
Start: 2020-07-07 | End: 2020-09-23

## 2020-07-07 NOTE — TELEPHONE ENCOUNTER
Annual scheduled: 9/22/20 with Dr Dominguez    Prescription approved per [facility] [place].  Bhavna Abbott RN on 7/7/2020 at 12:17 PM

## 2020-07-07 NOTE — TELEPHONE ENCOUNTER
"Requested Prescriptions   Pending Prescriptions Disp Refills     PREMPRO 0.3-1.5 MG tablet [Pharmacy Med Name: PREMPRO 0.3 MG-1.5 MG TABLET] 28 tablet 0     Sig: TAKE 1 TABLET BY MOUTH EVERY DAY       Hormone Replacement Therapy Failed - 7/7/2020 10:49 AM        Failed - Recent (12 mo) or future (30 days) visit within the authorizing provider's specialty     Patient has had an office visit with the authorizing provider or a provider within the authorizing providers department within the previous 12 mos or has a future within next 30 days. See \"Patient Info\" tab in inbasket, or \"Choose Columns\" in Meds & Orders section of the refill encounter.              Passed - Blood pressure under 140/90 in past 12 months     BP Readings from Last 3 Encounters:   02/15/20 114/68   06/12/19 118/70   04/30/18 101/73                 Passed - Patient has mammogram in past 2 years on file if age 50-75        Passed - Medication is active on med list        Passed - Patient is 18 years of age or older        Passed - No active pregnancy on record        Passed - No positive pregnancy test on record in past 12 months           Last Written Prescription Date:  6/8/20  Last Fill Quantity: 28,  # refills: 0   Last office visit: 6/12/2019 with prescribing provider:  Dr Dominguez   Future Office Visit:      Pt due for annual, no appt scheduled. Pt already received one month extension. Rx denied.   Bhavna Abbott RN on 7/7/2020 at 11:02 AM      "

## 2020-09-21 NOTE — PROGRESS NOTES
Gary is a 55 year old  female who presents for annual exam.     Besides routine health maintenance, she has no other health concerns today .    HPI:  The patient's PCP is  Claudine Scott MD.    Patient on prempro for hormone replacement therapy, no side effects  Needs a dexa soon  Mammogram today  Uses topical cortisone cream for vulvar itching occ for her lsa, minimal symptoms so far  2 kids        GYNECOLOGIC HISTORY:    Patient's last menstrual period was 2012.        Her current contraception method is: post menopausal  She  reports that she has never smoked. She has never used smokeless tobacco.    Patient is sexually active.  STD testing offered?  Declined  Last PHQ-9 score on record =   PHQ-9 SCORE 2020   PHQ-9 Total Score 0     Last GAD7 score on record =   LELE-7 SCORE 2020   Total Score 1     Alcohol Score = 1    HEALTH MAINTENANCE:  Cholesterol: (  Recent Labs   Lab Test 18  0823   CHOL 210*   HDL 54   *   TRIG 169*        Last Mammo: One year ago, Result: Normal, Next Mammo: Today   Pap: (  Lab Results   Component Value Date    PAP NIL 2018    )    Colonoscopy:  2018, Result: Normal, Next Colonoscopy: 8 years.  Dexa:  NA    Health maintenance updated:  no    HISTORY:  OB History    Para Term  AB Living   2 2 2 0 0 2   SAB TAB Ectopic Multiple Live Births   0 0 0 0 2      # Outcome Date GA Lbr Alexi/2nd Weight Sex Delivery Anes PTL Lv   2 Term 04 37w0d  3.232 kg (7 lb 2 oz) M    GINA      Name: CATHI   1 Term 97   3.005 kg (6 lb 10 oz) F    GINA      Name: ELENOR      Obstetric Comments    x 2 without complications       Patient Active Problem List   Diagnosis     CARDIOVASCULAR SCREENING; LDL GOAL LESS THAN 160     Past Surgical History:   Procedure Laterality Date     BIOPSY/EXCISION LYMPH NODE NEEDLE SUPERFICIAL  GROIN NODE     COLONOSCOPY N/A 2018    Procedure: COLONOSCOPY;  colonoscopy;  Surgeon: Grisel Lemus  "MD Kevin;  Location: [facility] REMOVAL OF TONSILS,12+ Y/O      Tonsils 12+y.o.     wisdom teeth extracted        Social History     Tobacco Use     Smoking status: Never Smoker     Smokeless tobacco: Never Used   Substance Use Topics     Alcohol use: Yes     Alcohol/week: 0.0 standard drinks     Frequency: 2-3 times a week     Drinks per session: 1 or 2     Binge frequency: Never     Comment: 1 glass of wine or 2 /week       Problem (# of Occurrences) Relation (Name,Age of Onset)    Breast Cancer (1) Maternal Grandmother    Diabetes (2) Paternal Grandmother: type 2 , Maternal Aunt:  of complications in her mid 30's - type 1     Heart Disease (1) Father (54): MI in his mid 50's s/p CABG x 2     No Known Problems (5) Mother, Sister, Brother, Maternal Grandfather, Other            Current Outpatient Medications   Medication Sig     estrogen conj-medroxyPROGESTERone (PREMPRO) 0.3-1.5 MG tablet TAKE 1 TABLET BY MOUTH EVERY DAY     No current facility-administered medications for this visit.      Allergies   Allergen Reactions     Amoxicillin GI Disturbance     Penicillins        Past medical, surgical, social and family histories were reviewed and updated in EPIC.    ROS:   12 point review of systems negative other than symptoms noted below or in the HPI.  No urinary frequency or dysuria, bladder or kidney problems    EXAM:  BP 96/52   Ht 1.613 m (5' 3.5\")   Wt 57.7 kg (127 lb 3.2 oz)   LMP 2012   BMI 22.18 kg/m     BMI: Body mass index is 22.18 kg/m .    PHYSICAL EXAM:  Constitutional:   Appearance: Well nourished, well developed, alert, in no acute distress  Neck:  Lymph Nodes:  No lymphadenopathy present    Thyroid:  Gland size normal, nontender, no nodules or masses present  on palpation  Chest:  Respiratory Effort:  Breathing unlabored  Cardiovascular:    Heart: Auscultation:  Regular rate, normal rhythm, no murmurs present  Breasts: Inspection of Breasts:  No lymphadenopathy present., Palpation " of Breasts and Axillae:  No masses present on palpation, no breast tenderness., Axillary Lymph Nodes:  No lymphadenopathy present. and No nodularity, asymmetry or nipple discharge bilaterally.  Gastrointestinal:   Abdominal Examination:  Abdomen nontender to palpation, tone normal without rigidity or guarding, no masses present, umbilicus without lesions   Liver and Spleen:  No hepatomegaly present, liver nontender to palpation    Hernias:  No hernias present  Lymphatic: Lymph Nodes:  No other lymphadenopathy present  Skin:  General Inspection:  No rashes present, no lesions present, no areas of  discoloration  Neurologic:    Mental Status:  Oriented X3.  Normal strength and tone, sensory exam                grossly normal, mentation intact and speech normal.    Psychiatric:   Mentation appears normal and affect normal/bright.         Pelvic Exam:  External Genitalia:     Normal appearance for age, no discharge present, no tenderness present, no inflammatory lesions present, color normal  Vagina:     Normal vaginal vault without central or paravaginal defects, no discharge present, no inflammatory lesions present, no masses present  Bladder:     Nontender to palpation  Urethra:   Urethral Body:  Urethra palpation normal, urethra structural support normal   Urethral Meatus:  No erythema or lesions present  Cervix:     Appearance healthy, no lesions present, nontender to palpation, no bleeding present  Uterus:     Uterus: firm, normal sized and nontender, midplane in position.   Adnexa:     No adnexal tenderness present, no adnexal masses present  Perineum:     Perineum within normal limits, no evidence of trauma, no rashes or skin lesions present  Anus:     Anus within normal limits, no hemorrhoids present  Inguinal Lymph Nodes:     No lymphadenopathy present  Pubic Hair:     Normal pubic hair distribution for age  Genitalia and Groin:     No rashes present, no lesions present, no areas of discoloration, no masses  present      COUNSELING:   Reviewed preventive health counseling, as reflected in patient instructions       Regular exercise       Healthy diet/nutrition    BMI: Body mass index is 22.18 kg/m .      ASSESSMENT:  55 year old female with satisfactory annual exam.    ICD-10-CM    1. Encounter for gynecological examination without abnormal finding  Z01.419        PLAN:  Discussed risks and benefits of hormone replacement therapy,  prob ok to continue to age 59  Mammogram  Discussed covid precautions, mask data  rec 2000 international unit(s) Vit D daily  Not due for pap this yr    Elizabeth Dominguez MD

## 2020-09-22 ENCOUNTER — OFFICE VISIT (OUTPATIENT)
Dept: OBGYN | Facility: CLINIC | Age: 55
End: 2020-09-22
Payer: COMMERCIAL

## 2020-09-22 ENCOUNTER — ANCILLARY PROCEDURE (OUTPATIENT)
Dept: MAMMOGRAPHY | Facility: CLINIC | Age: 55
End: 2020-09-22
Payer: COMMERCIAL

## 2020-09-22 VITALS
WEIGHT: 127.2 LBS | HEIGHT: 64 IN | SYSTOLIC BLOOD PRESSURE: 96 MMHG | BODY MASS INDEX: 21.72 KG/M2 | DIASTOLIC BLOOD PRESSURE: 52 MMHG

## 2020-09-22 DIAGNOSIS — Z12.31 VISIT FOR SCREENING MAMMOGRAM: ICD-10-CM

## 2020-09-22 DIAGNOSIS — Z01.419 ENCOUNTER FOR GYNECOLOGICAL EXAMINATION WITHOUT ABNORMAL FINDING: Primary | ICD-10-CM

## 2020-09-22 PROCEDURE — 99396 PREV VISIT EST AGE 40-64: CPT | Performed by: OBSTETRICS & GYNECOLOGY

## 2020-09-22 PROCEDURE — 77067 SCR MAMMO BI INCL CAD: CPT | Mod: TC

## 2020-09-22 ASSESSMENT — ANXIETY QUESTIONNAIRES
1. FEELING NERVOUS, ANXIOUS, OR ON EDGE: NOT AT ALL
6. BECOMING EASILY ANNOYED OR IRRITABLE: NOT AT ALL
IF YOU CHECKED OFF ANY PROBLEMS ON THIS QUESTIONNAIRE, HOW DIFFICULT HAVE THESE PROBLEMS MADE IT FOR YOU TO DO YOUR WORK, TAKE CARE OF THINGS AT HOME, OR GET ALONG WITH OTHER PEOPLE: NOT DIFFICULT AT ALL
5. BEING SO RESTLESS THAT IT IS HARD TO SIT STILL: NOT AT ALL
7. FEELING AFRAID AS IF SOMETHING AWFUL MIGHT HAPPEN: NOT AT ALL
2. NOT BEING ABLE TO STOP OR CONTROL WORRYING: NOT AT ALL
3. WORRYING TOO MUCH ABOUT DIFFERENT THINGS: NOT AT ALL
GAD7 TOTAL SCORE: 1

## 2020-09-22 ASSESSMENT — MIFFLIN-ST. JEOR: SCORE: 1149.04

## 2020-09-22 ASSESSMENT — PATIENT HEALTH QUESTIONNAIRE - PHQ9
SUM OF ALL RESPONSES TO PHQ QUESTIONS 1-9: 0
5. POOR APPETITE OR OVEREATING: SEVERAL DAYS

## 2020-09-23 DIAGNOSIS — Z79.890 POSTMENOPAUSAL HRT (HORMONE REPLACEMENT THERAPY): ICD-10-CM

## 2020-09-23 RX ORDER — ESTROGEN,CON/M-PROGEST ACET 0.3-1.5MG
TABLET ORAL
Qty: 90 TABLET | Refills: 2 | Status: SHIPPED | OUTPATIENT
Start: 2020-09-23 | End: 2021-06-07

## 2020-09-23 ASSESSMENT — ANXIETY QUESTIONNAIRES: GAD7 TOTAL SCORE: 1

## 2020-09-23 NOTE — TELEPHONE ENCOUNTER
"Requested Prescriptions   Pending Prescriptions Disp Refills     PREMPRO 0.3-1.5 MG tablet [Pharmacy Med Name: PREMPRO 0.3 MG-1.5 MG TABLET] 28 tablet 2     Sig: TAKE 1 TABLET BY MOUTH EVERY DAY       Hormone Replacement Therapy Passed - 9/23/2020 12:29 AM        Passed - Blood pressure under 140/90 in past 12 months     BP Readings from Last 3 Encounters:   09/22/20 96/52   02/15/20 114/68   06/12/19 118/70                 Passed - Recent (12 mo) or future (30 days) visit within the authorizing provider's specialty     Patient has had an office visit with the authorizing provider or a provider within the authorizing providers department within the previous 12 mos or has a future within next 30 days. See \"Patient Info\" tab in inbasket, or \"Choose Columns\" in Meds & Orders section of the refill encounter.              Passed - Patient has mammogram in past 2 years on file if age 50-75        Passed - Medication is active on med list        Passed - Patient is 18 years of age or older        Passed - No active pregnancy on record        Passed - No positive pregnancy test on record in past 12 months           Prescription approved per [facility] Refill Protocol.  Michelle Graham RN on 9/23/2020 at 7:23 AM    "

## 2021-06-06 DIAGNOSIS — Z79.890 POSTMENOPAUSAL HRT (HORMONE REPLACEMENT THERAPY): ICD-10-CM

## 2021-06-07 RX ORDER — ESTROGEN,CON/M-PROGEST ACET 0.3-1.5MG
TABLET ORAL
Qty: 112 TABLET | Refills: 0 | Status: SHIPPED | OUTPATIENT
Start: 2021-06-07 | End: 2021-09-24

## 2021-06-07 NOTE — TELEPHONE ENCOUNTER
"Requested Prescriptions   Pending Prescriptions Disp Refills     PREMPRO 0.3-1.5 MG tablet [Pharmacy Med Name: PREMPRO 0.3 MG-1.5 MG TABLET] 28 tablet 8     Sig: TAKE 1 TABLET BY MOUTH EVERY DAY       Hormone Replacement Therapy Passed - 6/6/2021 12:46 AM        Passed - Blood pressure under 140/90 in past 12 months     BP Readings from Last 3 Encounters:   09/22/20 96/52   02/15/20 114/68   06/12/19 118/70                 Passed - Recent (12 mo) or future (30 days) visit within the authorizing provider's specialty     Patient has had an office visit with the authorizing provider or a provider within the authorizing providers department within the previous 12 mos or has a future within next 30 days. See \"Patient Info\" tab in inbasket, or \"Choose Columns\" in Meds & Orders section of the refill encounter.              Passed - Patient has mammogram in past 2 years on file if age 50-75        Passed - Medication is active on med list        Passed - Patient is 18 years of age or older        Passed - No active pregnancy on record        Passed - No positive pregnancy test on record in past 12 months           Last Written Prescription Date:  09/23/2020  Last Fill Quantity: 90,  # refills: 2   Last office visit: 9/22/2020 with prescribing provider:  Alberto   Future Office Visit:      Prescription approved per [facility] Refill Protocol.  Lziz De La Garza RN on 6/7/2021 at 8:03 AM              "

## 2021-06-17 ENCOUNTER — OFFICE VISIT (OUTPATIENT)
Dept: FAMILY MEDICINE | Facility: CLINIC | Age: 56
End: 2021-06-17
Payer: COMMERCIAL

## 2021-06-17 VITALS
OXYGEN SATURATION: 99 % | HEART RATE: 62 BPM | SYSTOLIC BLOOD PRESSURE: 96 MMHG | BODY MASS INDEX: 20.99 KG/M2 | DIASTOLIC BLOOD PRESSURE: 60 MMHG | TEMPERATURE: 99.1 F | RESPIRATION RATE: 12 BRPM | HEIGHT: 65 IN | WEIGHT: 126 LBS

## 2021-06-17 DIAGNOSIS — J01.00 ACUTE MAXILLARY SINUSITIS, RECURRENCE NOT SPECIFIED: Primary | ICD-10-CM

## 2021-06-17 PROCEDURE — 99213 OFFICE O/P EST LOW 20 MIN: CPT | Performed by: FAMILY MEDICINE

## 2021-06-17 RX ORDER — AZITHROMYCIN 250 MG/1
TABLET, FILM COATED ORAL
Qty: 6 TABLET | Refills: 0 | Status: SHIPPED | OUTPATIENT
Start: 2021-06-17 | End: 2022-01-13

## 2021-06-17 ASSESSMENT — ENCOUNTER SYMPTOMS
DIARRHEA: 0
HEADACHES: 1
NECK PAIN: 0
ABDOMINAL PAIN: 0
RHINORRHEA: 1
SORE THROAT: 0
VOMITING: 0
COUGH: 0

## 2021-06-17 ASSESSMENT — PAIN SCALES - GENERAL: PAINLEVEL: MODERATE PAIN (4)

## 2021-06-17 ASSESSMENT — MIFFLIN-ST. JEOR: SCORE: 1154.47

## 2021-06-17 NOTE — PROGRESS NOTES
"    Assessment & Plan     Acute maxillary sinusitis, recurrence not specified: discussed that symptoms likely viral. Continue symptomatic management at home. If not improving by early next week, okay to start antibiotic. If still not feeling better in the next 1-2 weeks, follow up in clinic.  - azithromycin (ZITHROMAX) 250 MG tablet; Two tablets first day, then one tablet daily for four days.        Return in about 2 weeks (around 7/1/2021) for follow up if symptoms not improving.    Estuardo Grayson DO  [facility] PRIOR MAURICE Vega is a 56 year old who presents for the following health issues     HPI      Question 6/17/2021  2:00 PM CDT - Filed by Patient   Which ear is affected by pain? Left   Your ear pain is a  Recurring problem   When did you first notice your ear pain? In the past 7 days   Since you first noticed your ear pain, how has it changed? Gradually worsening   How often does your ear pain occur? Constantly   How high has your fever been? 100.4-100.9  F   How long has your fever lasted? 1 to 2 days   On a scale of 0 to 10 (10 being the worst), how severe is your ear pain? 4   Are you experiencing any of the following symptoms with your ear pain?    Abdominal pain No   Ear discharge No   Rash No   Cough No   Headaches Yes   Runny nose Yes   Diarrhea No   Hearing loss No   Sore throat No   Neck pain No   Vomiting No   Subjective fevers, tired. Takes Advil which is helpful.     Review of Systems   Constitutional, HEENT, cardiovascular, pulmonary, gi and gu systems are negative, except as otherwise noted.      Objective    BP 96/60   Pulse 62   Temp 99.1  F (37.3  C) (Tympanic)   Resp 12   Ht 1.638 m (5' 4.5\")   Wt 57.2 kg (126 lb)   LMP 04/01/2012   SpO2 99%   BMI 21.29 kg/m    Body mass index is 21.29 kg/m .  Physical Exam   GENERAL: healthy, alert and no distress  HENT: normal cephalic/atraumatic, ear canals and TM's normal, nose and mouth without ulcers or " lesions, oropharynx clear, oral mucous membranes moist and sinuses: maxillary, frontal tenderness on left  NECK: no adenopathy and no asymmetry, masses, or scars  RESP: lungs clear to auscultation - no rales, rhonchi or wheezes  CV: regular rate and rhythm, normal S1 S2, no S3 or S4, no murmur, click or rub, no peripheral edema and peripheral pulses strong  PSYCH: mentation appears normal, affect normal/bright

## 2021-09-24 DIAGNOSIS — Z79.890 POSTMENOPAUSAL HRT (HORMONE REPLACEMENT THERAPY): ICD-10-CM

## 2021-09-24 RX ORDER — ESTROGEN,CON/M-PROGEST ACET 0.3-1.5MG
TABLET ORAL
Qty: 28 TABLET | Refills: 0 | Status: SHIPPED | OUTPATIENT
Start: 2021-09-24 | End: 2021-10-28

## 2021-09-24 NOTE — TELEPHONE ENCOUNTER
"Requested Prescriptions   Pending Prescriptions Disp Refills     PREMPRO 0.3-1.5 MG tablet [Pharmacy Med Name: PREMPRO 0.3 MG-1.5 MG TABLET] 28 tablet 3     Sig: TAKE 1 TABLET BY MOUTH EVERY DAY       Hormone Replacement Therapy Failed - 9/24/2021 12:31 AM        Failed - Recent (12 mo) or future (30 days) visit within the authorizing provider's specialty     Patient has had an office visit with the authorizing provider or a provider within the authorizing providers department within the previous 12 mos or has a future within next 30 days. See \"Patient Info\" tab in inbasket, or \"Choose Columns\" in Meds & Orders section of the refill encounter.              Passed - Blood pressure under 140/90 in past 12 months     BP Readings from Last 3 Encounters:   06/17/21 96/60   09/22/20 96/52   02/15/20 114/68                 Passed - Patient has mammogram in past 2 years on file if age 50-75        Passed - Medication is active on med list        Passed - Patient is 18 years of age or older        Passed - No active pregnancy on record        Passed - No positive pregnancy test on record in past 12 months           Last Written Prescription Date:  6/7/21  Last Fill Quantity: 112,  # refills: 0   Last office visit: 9/22/2020 with prescribing provider:  Dr Dominguez   Future Office Visit: None     Refill approved for one month  Appointment needed for further refills  Michelle Graham RN on 9/24/2021 at 10:14 AM        "

## 2021-10-18 DIAGNOSIS — Z79.890 POSTMENOPAUSAL HRT (HORMONE REPLACEMENT THERAPY): ICD-10-CM

## 2021-10-18 RX ORDER — ESTROGEN,CON/M-PROGEST ACET 0.3-1.5MG
TABLET ORAL
Qty: 28 TABLET | Refills: 0 | OUTPATIENT
Start: 2021-10-18

## 2021-10-18 NOTE — TELEPHONE ENCOUNTER
"Requested Prescriptions   Pending Prescriptions Disp Refills     PREMPRO 0.3-1.5 MG tablet [Pharmacy Med Name: PREMPRO 0.3 MG-1.5 MG TABLET] 28 tablet 0     Sig: TAKE 1 TABLET BY MOUTH EVERY DAY       Hormone Replacement Therapy Failed - 10/18/2021 12:28 AM        Failed - Recent (12 mo) or future (30 days) visit within the authorizing provider's specialty     Patient has had an office visit with the authorizing provider or a provider within the authorizing providers department within the previous 12 mos or has a future within next 30 days. See \"Patient Info\" tab in inbasket, or \"Choose Columns\" in Meds & Orders section of the refill encounter.              Passed - Blood pressure under 140/90 in past 12 months     BP Readings from Last 3 Encounters:   06/17/21 96/60   09/22/20 96/52   02/15/20 114/68                 Passed - Patient has mammogram in past 2 years on file if age 50-75        Passed - Medication is active on med list        Passed - Patient is 18 years of age or older        Passed - No active pregnancy on record        Passed - No positive pregnancy test on record in past 12 months           Last Written Prescription Date:  9/24/21  Last Fill Quantity: 28,  # refills: 0   Last office visit: 9/22/2020 with prescribing provider:  Alberto   Future Office Visit:  none          "

## 2021-10-18 NOTE — TELEPHONE ENCOUNTER
Pt due for annual, no appt scheduled. Pt already received one month extension. Rx denied.   Bhavna Abbott RN on 10/18/2021 at 12:43 PM

## 2021-10-28 RX ORDER — ESTROGEN,CON/M-PROGEST ACET 0.3-1.5MG
1 TABLET ORAL DAILY
Qty: 90 TABLET | Refills: 0 | Status: SHIPPED | OUTPATIENT
Start: 2021-10-28 | End: 2021-11-01

## 2021-10-28 NOTE — TELEPHONE ENCOUNTER
Future Office Visit:  1/27/22 with Dr. Dominguez      Medication is being filled for 1 time refill only due to:  Patient needs to be seen because it has been more than one year since last visit.  Appointment scheduled  Randee Soni RN on 10/28/2021 at 11:13 AM

## 2021-10-30 ENCOUNTER — HEALTH MAINTENANCE LETTER (OUTPATIENT)
Age: 56
End: 2021-10-30

## 2021-11-01 ENCOUNTER — MYC REFILL (OUTPATIENT)
Dept: OBGYN | Facility: CLINIC | Age: 56
End: 2021-11-01

## 2021-11-01 DIAGNOSIS — Z79.890 POSTMENOPAUSAL HRT (HORMONE REPLACEMENT THERAPY): ICD-10-CM

## 2021-11-02 RX ORDER — ESTROGEN,CON/M-PROGEST ACET 0.3-1.5MG
1 TABLET ORAL DAILY
Qty: 90 TABLET | Refills: 0 | Status: SHIPPED | OUTPATIENT
Start: 2021-11-02 | End: 2022-01-27

## 2021-11-02 NOTE — TELEPHONE ENCOUNTER
"Requested Prescriptions   Pending Prescriptions Disp Refills     estrogen conj-medroxyPROGESTERone (PREMPRO) 0.3-1.5 MG tablet 90 tablet 0     Sig: Take 1 tablet by mouth daily .  Appointment needed for additional refills.       Hormone Replacement Therapy Failed - 11/1/2021  9:26 PM        Failed - Recent (12 mo) or future (30 days) visit within the authorizing provider's specialty     Patient has had an office visit with the authorizing provider or a provider within the authorizing providers department within the previous 12 mos or has a future within next 30 days. See \"Patient Info\" tab in inbasket, or \"Choose Columns\" in Meds & Orders section of the refill encounter.              Passed - Blood pressure under 140/90 in past 12 months     BP Readings from Last 3 Encounters:   06/17/21 96/60   09/22/20 96/52   02/15/20 114/68                 Passed - Patient has mammogram in past 2 years on file if age 50-75        Passed - Medication is active on med list        Passed - Patient is 18 years of age or older        Passed - No active pregnancy on record        Passed - No positive pregnancy test on record in past 12 months           Next 5 appointments (look out 90 days)    Jan 27, 2022 11:00 AM  PHYSICAL with Elizabeth Dominguez MD  [facility] ([facility] ) [address] [place] [id number]  [phone]        Prescription approved per [facility] Refill Protocol.  Michelle Graham RN on 11/2/2021 at 4:38 PM    "

## 2021-12-11 ENCOUNTER — VIRTUAL VISIT (OUTPATIENT)
Dept: URGENT CARE | Facility: CLINIC | Age: 56
End: 2021-12-11
Payer: COMMERCIAL

## 2021-12-11 DIAGNOSIS — R09.81 NASAL CONGESTION: Primary | ICD-10-CM

## 2021-12-11 PROCEDURE — 99213 OFFICE O/P EST LOW 20 MIN: CPT | Mod: TEL | Performed by: EMERGENCY MEDICINE

## 2021-12-11 NOTE — PROGRESS NOTES
Phone appointment:    Assessment: Multiple URI symptoms including sore throat, earache, fever, chills, cough.  No shortness of breath.    Plan: Covid PCR ordered.  Encouraged to come to urgent care for persistent earache to rule out ear infection.    CHIEF COMPLAINT: Multiple complaints      HPI: Patient is a 56-year-old female whose had several days of URI symptoms to include sore throat, earache, fever, chills, and slight cough.  No shortness of breath.  No obvious Covid exposure.      ROS: See HPI otherwise normal.    Allergies   Allergen Reactions     Amoxicillin GI Disturbance     Pcn [Penicillins]       Current Outpatient Medications   Medication Sig Dispense Refill     azithromycin (ZITHROMAX) 250 MG tablet Two tablets first day, then one tablet daily for four days. 6 tablet 0     estrogen conj-medroxyPROGESTERone (PREMPRO) 0.3-1.5 MG tablet Take 1 tablet by mouth daily .  Appointment needed for additional refills. 90 tablet 0         PE: No acute distress on phone appointment.  Alert.  Nondyspneic sounding.  Otherwise deferred.        TREATMENT: None.      ASSESSMENT: Multiple viral symptoms by history with no severe associated symptoms.  Rule out Covid.      DIAGNOSIS: URI.      PLAN: Covid PCR ordered.  Follow-up if any worsening symptoms or concerns.  Testing team to follow.

## 2021-12-13 ENCOUNTER — LAB (OUTPATIENT)
Dept: URGENT CARE | Facility: URGENT CARE | Age: 56
End: 2021-12-13
Attending: EMERGENCY MEDICINE
Payer: COMMERCIAL

## 2021-12-13 DIAGNOSIS — R09.81 NASAL CONGESTION: ICD-10-CM

## 2021-12-13 LAB — SARS-COV-2 RNA RESP QL NAA+PROBE: POSITIVE

## 2021-12-13 PROCEDURE — U0005 INFEC AGEN DETEC AMPLI PROBE: HCPCS

## 2021-12-13 PROCEDURE — U0003 INFECTIOUS AGENT DETECTION BY NUCLEIC ACID (DNA OR RNA); SEVERE ACUTE RESPIRATORY SYNDROME CORONAVIRUS 2 (SARS-COV-2) (CORONAVIRUS DISEASE [COVID-19]), AMPLIFIED PROBE TECHNIQUE, MAKING USE OF HIGH THROUGHPUT TECHNOLOGIES AS DESCRIBED BY CMS-2020-01-R: HCPCS

## 2021-12-15 ENCOUNTER — E-VISIT (OUTPATIENT)
Dept: FAMILY MEDICINE | Facility: CLINIC | Age: 56
End: 2021-12-15
Payer: COMMERCIAL

## 2021-12-15 DIAGNOSIS — Z20.822 SUSPECTED COVID-19 VIRUS INFECTION: Primary | ICD-10-CM

## 2021-12-15 PROCEDURE — 99421 OL DIG E/M SVC 5-10 MIN: CPT | Performed by: PHYSICIAN ASSISTANT

## 2021-12-15 NOTE — PATIENT INSTRUCTIONS
Dear Gary Bowers,    Your symptoms show that you may have coronavirus (COVID-19). This illness can cause fever, cough and trouble breathing. Many people get a mild case and get better on their own. Some people can get very sick.    Will I be tested for COVID-19?  We would like to test you for Covid-19 virus. I have placed orders for this test.     To schedule: go to your [employer] home page and scroll down to the section that says  You have an appointment that needs to be scheduled  and click the large green button that says  Schedule Now  and follow the steps to find the next available openings.    If you are unable to complete these [employer] scheduling steps, please call [phone] to schedule your testing.     Return to work/school/ guidance:  Please let your workplace manager and staffing office know when your quarantine ends     We can t give you an exact date as it depends on the above. You can calculate this on your own or work with your manager/staffing office to calculate this. (For example if you were exposed on 10/4, you would have to quarantine for 14 full days. That would be through 10/18. You could return on 10/19.)      If you receive a positive COVID-19 test result, follow the guidance of the those who are giving you the results. Usually the return to work is 10 (or in some cases 20 days from symptom onset.) If you work at [facility], you must also be cleared by Employee Occupational Health and Safety to return to work.        If you receive a negative COVID-19 test result and did not have a high risk exposure to someone with a known positive COVID-19 test, you can return to work once you're free of fever for 24 hours without fever-reducing medication and your symptoms are improving or resolved.      If you receive a negative COVID-19 test and If you had a high risk exposure to someone who has tested positive for COVID-19 then you can return to work 14 days after your last contact  with the positive individual    Note: If you have ongoing exposure to the covid positive person, this quarantine period may be more than 14 days. (For example, if you are continued to be exposed to your child who tested positive and cannot isolate from them, then the quarantine of 7-14 days can't start until your child is no longer contagious. This is typically 10 days from onset of the child's symptoms. So the total duration may be 17-24 days in this case.)    Sign up for [employer].   We know it's scary to hear that you might have COVID-19. We want to track your symptoms to make sure you're okay over the next 2 weeks. Please look for an email from [employer]--this is a free, online program that we'll use to keep in touch. To sign up, follow the link in the email you will receive. Learn more at [email or web address]    How can I take care of myself?    Get lots of rest. Drink extra fluids (unless a doctor has told you not to)    Take Tylenol (acetaminophen) or ibuprofen for fever or pain. If you have liver or kidney problems, ask your family doctor if it's okay to take Tylenol o ibuprofen    If you have other health problems (like cancer, heart failure, an organ transplant or severe kidney disease): Call your specialty clinic if you don't feel better in the next 2 days.    Know when to call 911. Emergency warning signs include:  o Trouble breathing or shortness of breath  o Pain or pressure in the chest that doesn't go away  o Feeling confused like you haven't felt before, or not being able to wake up  o Bluish-colored lips or face    Where can I get more information?  [employer] [place] - About COVID-19:   [email or web address]    CDC - What to Do If You're Sick:   www.cdc.gov/coronavirus/2019-ncov/about/steps-when-sick.html

## 2021-12-17 ENCOUNTER — LAB (OUTPATIENT)
Dept: URGENT CARE | Facility: URGENT CARE | Age: 56
End: 2021-12-17
Attending: PHYSICIAN ASSISTANT
Payer: COMMERCIAL

## 2021-12-17 DIAGNOSIS — Z20.822 SUSPECTED COVID-19 VIRUS INFECTION: ICD-10-CM

## 2021-12-17 LAB — SARS-COV-2 RNA RESP QL NAA+PROBE: POSITIVE

## 2021-12-17 PROCEDURE — U0003 INFECTIOUS AGENT DETECTION BY NUCLEIC ACID (DNA OR RNA); SEVERE ACUTE RESPIRATORY SYNDROME CORONAVIRUS 2 (SARS-COV-2) (CORONAVIRUS DISEASE [COVID-19]), AMPLIFIED PROBE TECHNIQUE, MAKING USE OF HIGH THROUGHPUT TECHNOLOGIES AS DESCRIBED BY CMS-2020-01-R: HCPCS

## 2021-12-17 PROCEDURE — U0005 INFEC AGEN DETEC AMPLI PROBE: HCPCS

## 2021-12-18 ENCOUNTER — TELEPHONE (OUTPATIENT)
Dept: EMERGENCY MEDICINE | Facility: CLINIC | Age: 56
End: 2021-12-18
Payer: COMMERCIAL

## 2021-12-18 NOTE — TELEPHONE ENCOUNTER
Patient returned call and stated I retested because I can not return to work until I have a negative test.  Patient was informed she could be positive up to 90 days of being infected.  Patient was advised of the following:   The guidelines for ending home isolation are     You've had no fever--and no medicine that reduces fever--for 1 full day (24 hours). And       Your other symptoms have gotten better. For example, your cough or breathing has improved. And     At least 10 days have passed since your symptoms started. (If you've been told by a doctor that you have a weak immune system, wait 20 days.)     You should not go back to work until you meet the guidelines above for ending your home isolation. You don't need to be retested for COVID-19 before going back to work--studies show that you won't spread the virus if it's been at least 10 days since your symptoms started (or 20 days, if you have a weak immune system).    Patient was advised to summit Covid results letter to employer to discuss testing protocol.     Maria Luz Riddle LPN

## 2021-12-18 NOTE — TELEPHONE ENCOUNTER
Coronavirus (COVID-19) Notification  2nd positive covid test   Reason for call  Notify of POSITIVE  COVID-19 lab result, assess symptoms,  review [facility] recommendations    Lab Result   Lab test for 2019-nCoV rRt-PCR or SARS-COV-2 PCR  Oropharyngeal AND/OR nasopharyngeal swabs were POSITIVE for 2019-nCoV RNA [OR] SARS-COV-2 RNA (COVID-19) RNA     We have been unable to reach Patient by phone at this time to notify of their Positive COVID-19 result.  Left voicemail message requesting a call back to [phone] [facility] for results.        POSITIVE COVID-19 Letter sent.    Maria Luz Riddle LPN

## 2021-12-20 ENCOUNTER — MYC MEDICAL ADVICE (OUTPATIENT)
Dept: FAMILY MEDICINE | Facility: CLINIC | Age: 56
End: 2021-12-20
Payer: COMMERCIAL

## 2021-12-22 NOTE — TELEPHONE ENCOUNTER
My chart message sent     Kristen Kaufman RN, BSN  [facility] - [facility]

## 2021-12-30 ENCOUNTER — MYC MEDICAL ADVICE (OUTPATIENT)
Dept: OBGYN | Facility: CLINIC | Age: 56
End: 2021-12-30
Payer: COMMERCIAL

## 2021-12-31 NOTE — TELEPHONE ENCOUNTER
We can definitely discuss alternative options at her upcoming visit.  Please print off her list and place it on my desk for review

## 2022-01-13 NOTE — TELEPHONE ENCOUNTER
Pharmacy calling regarding Prempro not being covered by insurance. Reiterated the plan going forward - pt and MD aware.    No further questions - will not attempt to refill at this time.    Bhavna Abbott RN on 1/13/2022 at 10:18 AM

## 2022-01-17 ENCOUNTER — TELEPHONE (OUTPATIENT)
Dept: OBGYN | Facility: CLINIC | Age: 57
End: 2022-01-17

## 2022-01-17 ENCOUNTER — VIRTUAL VISIT (OUTPATIENT)
Dept: FAMILY MEDICINE | Facility: CLINIC | Age: 57
End: 2022-01-17
Payer: COMMERCIAL

## 2022-01-17 DIAGNOSIS — R05.9 COUGH: ICD-10-CM

## 2022-01-17 DIAGNOSIS — J34.89 SINUS PRESSURE: ICD-10-CM

## 2022-01-17 DIAGNOSIS — J04.0 LARYNGITIS, ACUTE: Primary | ICD-10-CM

## 2022-01-17 DIAGNOSIS — R09.81 NASAL CONGESTION: ICD-10-CM

## 2022-01-17 PROCEDURE — 99213 OFFICE O/P EST LOW 20 MIN: CPT | Mod: 95 | Performed by: PHYSICIAN ASSISTANT

## 2022-01-17 NOTE — PROGRESS NOTES
Gary is a 56 year old who is being evaluated via a billable video visit.      How would you like to obtain your AVS? MyChart  If the video visit is dropped, the invitation should be resent by: Text to cell phone: [phone]  Will anyone else be joining your video visit? No    Video Start Time: Start: 01/17/2022 01:21 pm  Stop: 01/17/2022 01:37 pm    Assessment & Plan     Gary was seen today for uri.    Diagnoses and all orders for this visit:    Laryngitis, acute    Cough    Sinus pressure    Nasal congestion    Discussed DDx most c/w viral process at this time and reviewed supportive cares and OTC products she can take for symptomatic relief. Discussed red flags to watch for that would indicate bacterial infection and to mychart me if not improving over the next week or so. Could consider azithromycin at that time. Risks of abx therapy reviewed. Will also return for COVID19 booster when feel well again. Patient in agreement with plan.     Return in about 1 week (around 1/24/2022) for if not improving as expected or concerns.    Court Block PA-C  [facility]   Gary is a 56 year old who presents for the following health issues     HPI   Acute Illness  Acute illness concerns: URI  Onset/Duration: 2 weeks ago; started as sore throat and now more congestion/mucous and now lost her voice.  Onset 12 days ago.    Symptoms:  Fever: no   Chills/Sweats: no  Headache (location?): no  Sinus Pressure: YES - some soreness through maxillary regions and jaw  Conjunctivitis:  no  Ear Pain: no  Rhinorrhea: stuffy  Congestion: YES  Sore Throat: at onset, but now resolved, but has hoarseness   Cough: YES - feels gaggy at times and keep her awake at night. Non-productive.  No hx of asthma or chronic lung issues. No hx of pneumonia.  Non-smoker   Wheeze: no  Decreased Appetite: no   Nausea: no  Vomiting: no  Diarrhea: no  Dysuria/Freq.: no  Dysuria or Hematuria: no  Fatigue/Achiness:  from coughing  Sick/Strep Exposure: no  Therapies tried and outcome:   advil cold and sinus, cough drops  patient did home tests negative for covid  Received 1 J&J vaccination  Had COVID19 infection mid Dec - positive test 12/13. No residual symptoms from this fortunately and feels she made it through better than current illness.  Planning for booster once she is feeling better.      Current Outpatient Medications   Medication     estrogen conj-medroxyPROGESTERone (PREMPRO) 0.3-1.5 MG tablet     No current facility-administered medications for this visit.        Allergies   Allergen Reactions     Amoxicillin GI Disturbance     Pcn [Penicillins]        Review of Systems   Constitutional, HEENT, cardiovascular, pulmonary, gi and gu systems are negative, except as otherwise noted.      Objective           Vitals:  No vitals were obtained today due to virtual visit.    Physical Exam   GENERAL: Healthy, alert and no distress  EYES: Eyes grossly normal to inspection.  No discharge or erythema, or obvious scleral/conjunctival abnormalities.  RESP: No audible wheeze, cough, or visible cyanosis.  No visible retractions or increased work of breathing. Audible hoarseness.  SKIN: Visible skin clear. No significant rash, abnormal pigmentation or lesions.  NEURO: Cranial nerves grossly intact.  Mentation and speech appropriate for age.  PSYCH: Mentation appears normal, affect normal/bright, judgement and insight intact, normal speech and appearance well-groomed.              Video-Visit Details    Type of service:  Video Visit    Video End Time:Start: 01/17/2022 01:21 pm  Stop: 01/17/2022 01:37 pm    Originating Location (pt. Location): Home    Distant Location (provider location):  [facility]     Platform used for Video Visit: [employer]

## 2022-01-17 NOTE — TELEPHONE ENCOUNTER
Prior Authorization Retail Medication Request    Medication/Dose: estrogen conj-medroxyPROGESTERone (PREMPRO) 0.3-1.5 MG tablet  ICD code: Postmenopausal HRT (hormone replacement therapy) [Z79.890]   Previously Tried and Failed:  N/A  Rationale:  Has been on this formulation of HRT since 2014 without side effects and with annual monitoring and discussion regarding replacement therapy.     Insurance Name:  [facility]  Insurance ID:  [id number]

## 2022-01-18 DIAGNOSIS — Z79.890 POSTMENOPAUSAL HRT (HORMONE REPLACEMENT THERAPY): ICD-10-CM

## 2022-01-18 RX ORDER — ESTROGEN,CON/M-PROGEST ACET 0.3-1.5MG
1 TABLET ORAL DAILY
Qty: 28 TABLET | Refills: 2 | OUTPATIENT
Start: 2022-01-18

## 2022-01-18 NOTE — TELEPHONE ENCOUNTER
"Requested Prescriptions   Pending Prescriptions Disp Refills     PREMPRO 0.3-1.5 MG tablet [Pharmacy Med Name: PREMPRO 0.3 MG-1.5 MG TABLET] 28 tablet 2     Sig: TAKE 1 TABLET BY MOUTH DAILY . APPOINTMENT NEEDED FOR ADDITIONAL REFILLS.       Hormone Replacement Therapy Passed - 1/18/2022 12:34 AM        Passed - Blood pressure under 140/90 in past 12 months     BP Readings from Last 3 Encounters:   06/17/21 96/60   09/22/20 96/52   02/15/20 114/68                 Passed - Recent (12 mo) or future (30 days) visit within the authorizing provider's specialty     Patient has had an office visit with the authorizing provider or a provider within the authorizing providers department within the previous 12 mos or has a future within next 30 days. See \"Patient Info\" tab in inbasket, or \"Choose Columns\" in Meds & Orders section of the refill encounter.              Passed - Patient has mammogram in past 2 years on file if age 50-75        Passed - Medication is active on med list        Passed - Patient is 18 years of age or older        Passed - No active pregnancy on record        Passed - No positive pregnancy test on record in past 12 months           Requested Prescriptions   Pending Prescriptions Disp Refills     PREMPRO 0.3-1.5 MG tablet [Pharmacy Med Name: PREMPRO 0.3 MG-1.5 MG TABLET] 28 tablet 2     Sig: TAKE 1 TABLET BY MOUTH DAILY . APPOINTMENT NEEDED FOR ADDITIONAL REFILLS.       Hormone Replacement Therapy Passed - 1/18/2022 12:34 AM        Passed - Blood pressure under 140/90 in past 12 months     BP Readings from Last 3 Encounters:   06/17/21 96/60   09/22/20 96/52   02/15/20 114/68                 Passed - Recent (12 mo) or future (30 days) visit within the authorizing provider's specialty     Patient has had an office visit with the authorizing provider or a provider within the authorizing providers department within the previous 12 mos or has a future within next 30 days. See \"Patient Info\" tab in " "inbasket, or \"Choose Columns\" in Meds & Orders section of the refill encounter.              Passed - Patient has mammogram in past 2 years on file if age 50-75        Passed - Medication is active on med list        Passed - Patient is 18 years of age or older        Passed - No active pregnancy on record        Passed - No positive pregnancy test on record in past 12 months           Refill not placed at this time. PA in process  Michelle Graham RN on 1/18/2022 at 8:39 AM    "

## 2022-01-19 ENCOUNTER — MYC MEDICAL ADVICE (OUTPATIENT)
Dept: FAMILY MEDICINE | Facility: CLINIC | Age: 57
End: 2022-01-19
Payer: COMMERCIAL

## 2022-01-19 DIAGNOSIS — R05.9 COUGH: Primary | ICD-10-CM

## 2022-01-19 NOTE — TELEPHONE ENCOUNTER
Central Prior Authorization Team   Phone: [phone]    PA Initiation    Medication: estrogen conj-medroxyPROGESTERone (PREMPRO) 0.3-1.5 MG tablet  Insurance Company: [employer] - Phone [phone] Fax [phone]  Pharmacy Filling the Rx: [id number] [place] - [address] [address]  Filling Pharmacy Phone: [phone]  Filling Pharmacy Fax: [phone]  Start Date: 1/19/2022

## 2022-01-23 NOTE — TELEPHONE ENCOUNTER
Pt was seen - VV - on 1/17/2022     My chart message sent     Also will send on to provider     Kristen Kaufman RN, BSN  [facility]

## 2022-01-24 RX ORDER — AZITHROMYCIN 250 MG/1
TABLET, FILM COATED ORAL
Qty: 6 TABLET | Refills: 0 | Status: SHIPPED | OUTPATIENT
Start: 2022-01-24 | End: 2022-04-19

## 2022-01-24 NOTE — TELEPHONE ENCOUNTER
PRIOR AUTHORIZATION DENIED    Medication: estrogen conj-medroxyPROGESTERone (PREMPRO) 0.3-1.5 MG tablet-DENIED    Denial Date: 1/19/2022    Denial Rational: PATIENT MUST TRY/FAIL TWO FORMULARY ALTERNATIVES - COMBIPATCH, ACTIVELLA, AMABELZ.        Appeal Information:  IF PATIENT IS UNABLE TO TRY/FAIL ALTERNATIVE(S) PLEASE SUPPLY PA TEAM WITH A LETTER OF MEDICAL NECESSITY WITH CLINICAL REASON.

## 2022-01-27 ENCOUNTER — ANCILLARY PROCEDURE (OUTPATIENT)
Dept: MAMMOGRAPHY | Facility: CLINIC | Age: 57
End: 2022-01-27
Payer: COMMERCIAL

## 2022-01-27 ENCOUNTER — OFFICE VISIT (OUTPATIENT)
Dept: OBGYN | Facility: CLINIC | Age: 57
End: 2022-01-27
Payer: COMMERCIAL

## 2022-01-27 VITALS
DIASTOLIC BLOOD PRESSURE: 68 MMHG | BODY MASS INDEX: 20.38 KG/M2 | HEART RATE: 64 BPM | WEIGHT: 119.4 LBS | HEIGHT: 64 IN | SYSTOLIC BLOOD PRESSURE: 106 MMHG

## 2022-01-27 DIAGNOSIS — Z13.1 SCREENING FOR DIABETES MELLITUS: ICD-10-CM

## 2022-01-27 DIAGNOSIS — M85.89 OSTEOPENIA OF MULTIPLE SITES: ICD-10-CM

## 2022-01-27 DIAGNOSIS — Z12.31 VISIT FOR SCREENING MAMMOGRAM: ICD-10-CM

## 2022-01-27 DIAGNOSIS — Z01.419 ENCOUNTER FOR GYNECOLOGICAL EXAMINATION WITHOUT ABNORMAL FINDING: Primary | ICD-10-CM

## 2022-01-27 DIAGNOSIS — Z13.6 ENCOUNTER FOR LIPID SCREENING FOR CARDIOVASCULAR DISEASE: ICD-10-CM

## 2022-01-27 DIAGNOSIS — Z13.220 ENCOUNTER FOR LIPID SCREENING FOR CARDIOVASCULAR DISEASE: ICD-10-CM

## 2022-01-27 DIAGNOSIS — Z13.29 SCREENING FOR THYROID DISORDER: ICD-10-CM

## 2022-01-27 DIAGNOSIS — Z79.890 POSTMENOPAUSAL HRT (HORMONE REPLACEMENT THERAPY): ICD-10-CM

## 2022-01-27 PROCEDURE — 77067 SCR MAMMO BI INCL CAD: CPT | Mod: TC | Performed by: RADIOLOGY

## 2022-01-27 PROCEDURE — 99396 PREV VISIT EST AGE 40-64: CPT | Performed by: OBSTETRICS & GYNECOLOGY

## 2022-01-27 RX ORDER — NORETHINDRONE ACETATE AND ETHINYL ESTRADIOL .5; 2.5 MG/1; UG/1
1 TABLET ORAL DAILY
Qty: 90 TABLET | Refills: 3 | Status: SHIPPED | OUTPATIENT
Start: 2022-01-27 | End: 2022-12-14

## 2022-01-27 ASSESSMENT — ANXIETY QUESTIONNAIRES
7. FEELING AFRAID AS IF SOMETHING AWFUL MIGHT HAPPEN: NOT AT ALL
2. NOT BEING ABLE TO STOP OR CONTROL WORRYING: NOT AT ALL
1. FEELING NERVOUS, ANXIOUS, OR ON EDGE: NOT AT ALL
3. WORRYING TOO MUCH ABOUT DIFFERENT THINGS: NOT AT ALL
6. BECOMING EASILY ANNOYED OR IRRITABLE: NOT AT ALL
5. BEING SO RESTLESS THAT IT IS HARD TO SIT STILL: NOT AT ALL
GAD7 TOTAL SCORE: 0
IF YOU CHECKED OFF ANY PROBLEMS ON THIS QUESTIONNAIRE, HOW DIFFICULT HAVE THESE PROBLEMS MADE IT FOR YOU TO DO YOUR WORK, TAKE CARE OF THINGS AT HOME, OR GET ALONG WITH OTHER PEOPLE: NOT DIFFICULT AT ALL

## 2022-01-27 ASSESSMENT — PATIENT HEALTH QUESTIONNAIRE - PHQ9
5. POOR APPETITE OR OVEREATING: NOT AT ALL
SUM OF ALL RESPONSES TO PHQ QUESTIONS 1-9: 0

## 2022-01-27 ASSESSMENT — MIFFLIN-ST. JEOR: SCORE: 1108.65

## 2022-01-27 NOTE — PROGRESS NOTES
Gary is a 56 year old  female who presents for annual exam.     Besides routine health maintenance, she has no other health concerns today .    HPI:  Here today for yearly exam --former patient of Keyla Phillip and [facility].  Postmenopausal since her mid 40s  Has been on HRT for the last several years for hot flushes/night sweats with excellent control.  Recently found out that her insurance will not cover her prempro and looking for alternative option.  Has been without her meds for a couple of days without issues.  Rarely SA.  Some dryness and discomfort due to infrequency.  No bowle/bladder issues.  No leaking or incontinence.  Getting up occ at nighttime.    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 a smaller company; 2 children --oldest daughter getting masters in speech therapy and son is esvin in HS  -staying active with running and strength training  +mammo today; +SBE --no issues  PCP -Claudine Scott MD --sees as needed; has not had any fasting bloodwork since 2018; agrees to return at time of bone scan  -up to date on colonoscopy  -hx osteopenia with last bone scan in 2018 (spine -2.2, L hip -1.9, R hip -1.8) --will repeat this year  -has had covid vaccine but no booster --will schedule  -also interested in shingrix after her booster      GYNECOLOGIC HISTORY:    Patient's last menstrual period was 2012.    Her current contraception method is: menopause.  She  reports that she has never smoked. She has never used smokeless tobacco.    Patient is sexually active.  STD testing offered?  Declined  Last PHQ-9 score on record =   PHQ-9 SCORE 2022   PHQ-9 Total Score 0     Last GAD7 score on record =   LELE-7 SCORE 2022   Total Score 0     Alcohol Score = 3    HEALTH MAINTENANCE:  Cholesterol:  Recent Labs   Lab Test 18  0823   CHOL 210*   HDL 54   *   TRIG 169*   Last Mammo: 2020, Result: Normal, Next Mammo: Today  Pap:   Lab  Results   Component Value Date    PAP NIL, HPV- 2018     Colonoscopy:  2018, Result: Normal, Next Colonoscopy: 6 years.  Dexa:  2018    Health maintenance updated:  yes    HISTORY:  OB History    Para Term  AB Living   2 2 2 0 0 2   SAB IAB Ectopic Multiple Live Births   0 0 0 0 2      # Outcome Date GA Lbr Alexi/2nd Weight Sex Delivery Anes PTL Lv   2 Term 04 37w0d  3.232 kg (7 lb 2 oz) M    GINA      Name: CATHI   1 Term 97   3.005 kg (6 lb 10 oz) F    GINA      Name: ELENOR      Obstetric Comments    x 2 without complications       Patient Active Problem List   Diagnosis     CARDIOVASCULAR SCREENING; LDL GOAL LESS THAN 160     Past Surgical History:   Procedure Laterality Date     BIOPSY/EXCISION LYMPH NODE NEEDLE SUPERFICIAL  GROIN NODE     COLONOSCOPY N/A 2018    Procedure: COLONOSCOPY;  colonoscopy;  Surgeon: Grisel Lemus MD;  Location: [facility]     HC REMOVAL OF TONSILS,12+ Y/O      Tonsils 12+y.o.     wisdom teeth extracted        Social History     Tobacco Use     Smoking status: Never Smoker     Smokeless tobacco: Never Used   Substance Use Topics     Alcohol use: Yes     Alcohol/week: 0.0 standard drinks     Comment: 1 glass of wine or 2 /week       Problem (# of Occurrences) Relation (Name,Age of Onset)    Breast Cancer (1) Maternal Grandmother    Diabetes (2) Paternal Grandmother: type 2 , Maternal Aunt:  of complications in her mid 30's - type 1     Heart Disease (1) Father (54): MI in his mid 50's s/p CABG x 2     No Known Problems (5) Mother, Sister, Brother, Maternal Grandfather, Other            Current Outpatient Medications   Medication Sig     azithromycin (ZITHROMAX) 250 MG tablet Take 500mg daily on Day 1 and 250mg daily on Days 2-5.     norethindrone-eth estradiol (FEMHRT LOW DOSE) 0.5-2.5 MG-MCG tablet Take 1 tablet by mouth daily     No current facility-administered medications for this visit.     Allergies   Allergen Reactions  "    Amoxicillin GI Disturbance     Pcn [Penicillins]        Past medical, surgical, social and family histories were reviewed and updated in EPIC.    ROS:   12 point review of systems negative other than symptoms noted below or in the HPI.  No urinary frequency or dysuria, bladder or kidney problems    EXAM:  /68   Pulse 64   Ht 1.613 m (5' 3.5\")   Wt 54.2 kg (119 lb 6.4 oz)   LMP 04/01/2012   Breastfeeding No   BMI 20.82 kg/m     BMI: Body mass index is 20.82 kg/m .    PHYSICAL EXAM:  Constitutional:   Appearance: Well nourished, well developed, alert, in no acute distress  Neck:  Lymph Nodes:  No lymphadenopathy present    Thyroid:  Gland size normal, nontender, no nodules or masses present  on palpation  Chest:  Respiratory Effort:  Breathing unlabored  Cardiovascular:    Heart: Auscultation:  Regular rate, normal rhythm, no murmurs present  Breasts: Palpation of Breasts and Axillae:  No masses present on palpation, no breast tenderness., Axillary Lymph Nodes:  No lymphadenopathy present. and No nodularity, asymmetry or nipple discharge bilaterally.  Gastrointestinal:   Abdominal Examination:  Abdomen nontender to palpation, tone normal without rigidity or guarding, no masses present, umbilicus without lesions   Liver and Spleen:  No hepatomegaly present, liver nontender to palpation    Hernias:  No hernias present  Lymphatic: Lymph Nodes:  No other lymphadenopathy present  Skin:  General Inspection:  No rashes present, no lesions present, no areas of  discoloration  Neurologic:    Mental Status:  Oriented X3.  Normal strength and tone, sensory exam                grossly normal, mentation intact and speech normal.    Psychiatric:   Mentation appears normal and affect normal/bright.         Pelvic Exam:  External Genitalia:     Normal appearance for age, no discharge present, no tenderness present, no inflammatory lesions present, color normal; +HYPOPIGMENTATION OF PERICLITORAL AREA AND POSTERIOR " FOURCHETTE; NO TENDERNESS; CONSISTENT WITH LS  Vagina:     Normal vaginal vault without central or paravaginal defects, ATROPHIC  Bladder:     Nontender to palpation  Urethra:   Urethral Body:  Urethra palpation normal, urethra structural support normal   Urethral Meatus:  No erythema or lesions present  Cervix:     Appearance healthy, no lesions present, nontender to palpation, no bleeding present  Uterus:     Nontender to palpation, no masses present, position anteflexed, mobility: normal  Adnexa:     No adnexal tenderness present, no adnexal masses present  Perineum:     Perineum within normal limits, no evidence of trauma, no rashes or skin lesions present  Inguinal Lymph Nodes:     No lymphadenopathy present      COUNSELING:   Reviewed preventive health counseling, as reflected in patient instructions  Special attention given to:        Regular exercise       Healthy diet/nutrition       Osteoporosis prevention/bone health       (Rhina)menopause management    BMI: Body mass index is 20.82 kg/m .      ASSESSMENT:  56 year old female with satisfactory annual exam.    ICD-10-CM    1. Encounter for gynecological examination without abnormal finding  Z01.419    2. Osteopenia of multiple sites  M85.89 DX Hip/Pelvis/Spine   3. Screening for diabetes mellitus  Z13.1 Glucose whole blood   4. Encounter for lipid screening for cardiovascular disease  Z13.220 Lipid Profile    Z13.6    5. Screening for thyroid disorder  Z13.29 TSH with free T4 reflex   6. Postmenopausal HRT (hormone replacement therapy)  Z79.890 norethindrone-eth estradiol (FEMHRT LOW DOSE) 0.5-2.5 MG-MCG tablet       PLAN:  Patient Instructions   Return to clinic for screening Lipids, thyroid and Fasting Blood sugar at time of bone scan.  Follow up with your primary care provider for your other medical problems.  Continue self breast exam.  Increase physical activity and exercise.  Lab results will be called to the patient.  Usual safety and preventative  measures counseling done.  Last pap smear (2018) was normal and negative for the DNA of high risk HPV subtypes.  No pap was obtained this year.  This was discussed with the patient and she agrees with the plan.  Discussed Osteoporosis screening as well as calcium and Vitamin D recommendations.  Will return for bone scan due to osteopenia  Discussed Gary's HRT and recent request for medication change due to formulary issues.  Will use this opportunity to consider starting the weaning process.  Will first transition to different daily pill for the next few months.  Will call with any issues or need for higher dosage.  Will then consider every other day dosing for 6 months.      Daniela Valles MD

## 2022-01-27 NOTE — PATIENT INSTRUCTIONS
Return to clinic for screening Lipids, thyroid and Fasting Blood sugar at time of bone scan.  Follow up with your primary care provider for your other medical problems.  Continue self breast exam.  Increase physical activity and exercise.  Lab results will be called to the patient.  Usual safety and preventative measures counseling done.  Last pap smear (2018) was normal and negative for the DNA of high risk HPV subtypes.  No pap was obtained this year.  This was discussed with the patient and she agrees with the plan.  Discussed Osteoporosis screening as well as calcium and Vitamin D recommendations.  Will return for bone scan due to osteopenia  Discussed Gary's HRT and recent request for medication change due to formulary issues.  Will use this opportunity to consider starting the weaning process.  Will first transition to different daily pill for the next few months.  Will call with any issues or need for higher dosage.  Will then consider every other day dosing for 6 months.

## 2022-01-28 ASSESSMENT — ANXIETY QUESTIONNAIRES: GAD7 TOTAL SCORE: 0

## 2022-01-29 NOTE — TELEPHONE ENCOUNTER
Sandhya--we  have already discussed and chosen  a new medication  from her coverage list to replace prempro. New rx was sent at her yearly with me on 127

## 2022-02-18 ENCOUNTER — IMMUNIZATION (OUTPATIENT)
Dept: NURSING | Facility: CLINIC | Age: 57
End: 2022-02-18
Payer: COMMERCIAL

## 2022-02-18 PROCEDURE — 91305 COVID-19,PF,PFIZER (12+ YRS): CPT

## 2022-02-18 PROCEDURE — 0053A COVID-19,PF,PFIZER (12+ YRS): CPT

## 2022-03-15 ENCOUNTER — LAB (OUTPATIENT)
Dept: LAB | Facility: CLINIC | Age: 57
End: 2022-03-15
Payer: COMMERCIAL

## 2022-03-15 ENCOUNTER — ANCILLARY PROCEDURE (OUTPATIENT)
Dept: BONE DENSITY | Facility: CLINIC | Age: 57
End: 2022-03-15
Attending: OBSTETRICS & GYNECOLOGY
Payer: COMMERCIAL

## 2022-03-15 DIAGNOSIS — Z13.6 ENCOUNTER FOR LIPID SCREENING FOR CARDIOVASCULAR DISEASE: ICD-10-CM

## 2022-03-15 DIAGNOSIS — Z13.29 SCREENING FOR THYROID DISORDER: ICD-10-CM

## 2022-03-15 DIAGNOSIS — M81.0 OSTEOPOROSIS OF LUMBAR SPINE: ICD-10-CM

## 2022-03-15 DIAGNOSIS — Z13.1 SCREENING FOR DIABETES MELLITUS: ICD-10-CM

## 2022-03-15 DIAGNOSIS — Z13.220 ENCOUNTER FOR LIPID SCREENING FOR CARDIOVASCULAR DISEASE: ICD-10-CM

## 2022-03-15 DIAGNOSIS — M85.89 OSTEOPENIA OF MULTIPLE SITES: ICD-10-CM

## 2022-03-15 LAB — GLUCOSE BLD-MCNC: 80 MG/DL (ref 60–99)

## 2022-03-15 PROCEDURE — 84443 ASSAY THYROID STIM HORMONE: CPT

## 2022-03-15 PROCEDURE — 80061 LIPID PANEL: CPT

## 2022-03-15 PROCEDURE — 82947 ASSAY GLUCOSE BLOOD QUANT: CPT

## 2022-03-15 PROCEDURE — 36415 COLL VENOUS BLD VENIPUNCTURE: CPT

## 2022-03-15 PROCEDURE — 77080 DXA BONE DENSITY AXIAL: CPT | Performed by: OBSTETRICS & GYNECOLOGY

## 2022-03-15 NOTE — LETTER
[facility] [address] [address]  [place] [address]  Phone: [phone]  Fax: [phone]      Gary JUSTINE Robinson     Date:  3/17/2022   [address]   [place] [address]      Thank you for having your DEXA scan performed.  As you recall, the DEXA scan evaluates the strength of your bones.  It is important to know the strength of your bones to help you avoid breaking bones in the future.    A T score shows your risk for breaking a bone.  Normal bone has a T score of -0.9 or higher.  Some bone thinning (osteopenia) has a T score between -1.0 and -2.4.  A lot of bone thinning (osteoporosis) has a T score of -2.5 or lower.    Your T scores on 3/15/2022 were:     Left Hip:  -2.0 score    Right Hip:  -2.1 score    This T score shows you have some thinning (Osteopenia) Compared with your previous scan, this shows more bone loss.    Spine:  -2.8 score      This T score shows you have a lot of thinning (Osteoporosis) Compared with your previous scan, this shows more bone loss.      The good news is that there are treatments for making your bones stronger, if you need them.    Ways you can make your bones stronger:    Weight bearing exercises such as walking.  Eat three servings of dairy a day, or take calcium each day (1600-2625 mg).  Take Vitamin D each day (1,000-2,000 IU).    For more information, please make an appointment with your Primary Care Provider, request a brochure from our office, or look on our website and go to the Bone Density page.     Please make an appointment with your doctor to discuss treatment options. The scheduling phone number is [phone].    Gary, continue Vitamin D, Calcium and exercise.    Your bones have continued to thin since your last dexa scan and your spine is now in the range of osteoporosis.  Based on this score, I would recommend that we consider starting treatment to help slow and possibly reverse this  process.  Please call to set up an in person appointment with me at your earliest convenience.    Thank you.    Daniela Valles MD

## 2022-03-16 LAB
CHOLEST SERPL-MCNC: 161 MG/DL
FASTING STATUS PATIENT QL REPORTED: YES
HDLC SERPL-MCNC: 51 MG/DL
LDLC SERPL CALC-MCNC: 92 MG/DL
NONHDLC SERPL-MCNC: 110 MG/DL
TRIGL SERPL-MCNC: 88 MG/DL
TSH SERPL DL<=0.005 MIU/L-ACNC: 2.5 MU/L (ref 0.4–4)

## 2022-03-17 PROBLEM — M81.0 OSTEOPOROSIS OF LUMBAR SPINE: Status: ACTIVE | Noted: 2022-03-17

## 2022-04-19 ENCOUNTER — OFFICE VISIT (OUTPATIENT)
Dept: VASCULAR SURGERY | Facility: CLINIC | Age: 57
End: 2022-04-19
Payer: COMMERCIAL

## 2022-04-19 DIAGNOSIS — I78.1 SPIDER VEINS: Primary | ICD-10-CM

## 2022-04-19 PROCEDURE — 99202 OFFICE O/P NEW SF 15 MIN: CPT | Performed by: SURGERY

## 2022-04-19 NOTE — PROGRESS NOTES
[facility]UT[facility] CONSULT    Impression:   Asymptomatic spider veins of the bilateral lower extremities.    Plan:   I had a nice discussion with Gary reviewing the pathophysiology and treatment of spider veins.  She is interested in improved cosmesis.  She understands that this is not covered by insurance.  I reviewed the potential risks of hyperpigmentation and/or ulceration.  I discussed the specifics of the treatment and the anticipated postprocedural course with activity restrictions.  She verbalizes full understanding to the above.  She will likely pursue this when the weather cools down next fall.        HPI:   Gary Bowers is a healthy 57-year-old female who presents at this time for evaluation of bilateral lower extremity asymptomatic spider veins.  Her left thigh is the most severely affected area.  She has no prior history of venous intervention.  Past medical history is noncontributory.      CURRENT MEDICATIONS  norethindrone-eth estradiol (FEMHRT LOW DOSE) 0.5-2.5 MG-MCG tablet, Take 1 tablet by mouth daily    No current facility-administered medications on file prior to visit.        PAST MEDICAL HISTORY  Past Medical History:   Diagnosis Date     JOSE I (cervical intraepithelial neoplasia I) 1991     Dysfunctional uterine bleeding      Routine gynecological examination     Dr. Steph Phillip, [facility]          PAST SURGICAL HISTORY:  Past Surgical History:   Procedure Laterality Date     BIOPSY/EXCISION LYMPH NODE NEEDLE SUPERFICIAL  GROIN NODE     COLONOSCOPY N/A 4/30/2018    Procedure: COLONOSCOPY;  colonoscopy;  Surgeon: Grisel Lemus MD;  Location:  GI     HC REMOVAL OF TONSILS,12+ Y/O      Tonsils 12+y.o.     wisdom teeth extracted         ALLERGIES     Allergies   Allergen Reactions     Amoxicillin GI Disturbance     Pcn [Penicillins]        FAMILY HISTORY  Family History   Problem Relation Age of Onset     No Known Problems Mother      Heart Disease Father 54        MI  in his mid 50's s/p CABG x 2      Breast Cancer Maternal Grandmother      Diabetes Paternal Grandmother         type 2      Diabetes Maternal Aunt          of complications in her mid 30's - type 1      No Known Problems Sister      No Known Problems Brother      No Known Problems Maternal Grandfather      No Known Problems Other        SOCIAL HISTORY  Social History     Tobacco Use     Smoking status: Never Smoker     Smokeless tobacco: Never Used   Substance Use Topics     Alcohol use: Yes     Alcohol/week: 0.0 standard drinks     Comment: 1 glass of wine or 2 /week      Drug use: No     Comment: no herbal meds either        ROS:   Review of Systems   All other systems reviewed and are negative.        EXAM:  LMP 2012   Physical Exam  Constitutional:       Appearance: Normal appearance.   HENT:      Head: Normocephalic and atraumatic.   Eyes:      General: No scleral icterus.     Extraocular Movements: Extraocular movements intact.      Pupils: Pupils are equal, round, and reactive to light.   Cardiovascular:      Comments: Clusters of spider veins predominantly involving the mid left thigh.  No varicosities.  No edema.  Musculoskeletal:         General: Normal range of motion.      Cervical back: Normal range of motion.   Skin:     General: Skin is warm and dry.   Neurological:      General: No focal deficit present.      Mental Status: She is alert and oriented to person, place, and time. Mental status is at baseline.   Psychiatric:         Mood and Affect: Mood normal.         Behavior: Behavior normal.         Thought Content: Thought content normal.         Judgment: Judgment normal.         Labs:  LIPID RESULTS:  Lab Results   Component Value Date    CHOL 161 03/15/2022    CHOL 210 (H) 2018    HDL 51 03/15/2022    HDL 54 2018    LDL 92 03/15/2022     (H) 2018    TRIG 88 03/15/2022    TRIG 169 (H) 2018       CBC RESULTS:  No results found for: WBC, RBC, HGB, HCT, MCV,  MCH, MCHC, RDW, PLT    BMP RESULTS:  Lab Results   Component Value Date     04/02/2018    POTASSIUM 4.2 04/02/2018    CHLORIDE 107 04/02/2018    CO2 29 04/02/2018    ANIONGAP 5 04/02/2018    GLC 89 04/02/2018    BUN 13 04/02/2018    CR 0.81 04/02/2018    GFRESTIMATED 74 04/02/2018    GFRESTBLACK 89 04/02/2018    LYNN 8.9 04/02/2018        A1C RESULTS:  No results found for: A1C      Imaging:  No results found for this or any previous visit (from the past 24 hour(s)).      Total length of this encounter was 15 minutes.        Yvan San MD

## 2022-04-19 NOTE — LETTER
4/19/2022         RE: Gary Bowers  [address]   [place] [address]        Dear Colleague,    Thank you for referring your patient, Gary Bowers, to the [facility] [place]. Please see a copy of my visit note below.    After Visit Follow Up  Per Dr. San, patient was recommended to have 2 - 3 sessions at $400 of cosmetic sclerotherapy.    Reviewed with and gave to patient our vein clinic sclerotherapy packet of information which includes basic sclerotherapy information, pre-treatment instructions and post-treatment instructions. Also gave pt her compression hose Rx with her leg measurements to order thigh high PETITE compression hose online, gave [email or web address] information.    Patient in agreement with plan and had no further questions.    Debra Pichardo RN  [facility]  Vein Clinic    VEIN SOLUTIONS CONSULT    Impression:   Asymptomatic spider veins of the bilateral lower extremities.    Plan:   I had a nice discussion with Gary reviewing the pathophysiology and treatment of spider veins.  She is interested in improved cosmesis.  She understands that this is not covered by insurance.  I reviewed the potential risks of hyperpigmentation and/or ulceration.  I discussed the specifics of the treatment and the anticipated postprocedural course with activity restrictions.  She verbalizes full understanding to the above.  She will likely pursue this when the weather cools down next fall.        HPI:   Gary Bowers is a healthy 57-year-old female who presents at this time for evaluation of bilateral lower extremity asymptomatic spider veins.  Her left thigh is the most severely affected area.  She has no prior history of venous intervention.  Past medical history is noncontributory.      CURRENT MEDICATIONS  norethindrone-eth estradiol (FEMHRT LOW DOSE) 0.5-2.5 MG-MCG tablet, Take 1 tablet by mouth daily    No current facility-administered medications on file prior to  visit.        PAST MEDICAL HISTORY  Past Medical History:   Diagnosis Date     JOSE I (cervical intraepithelial neoplasia I)      Dysfunctional uterine bleeding      Routine gynecological examination     Dr. Steph Phillip, [facility] [place]          PAST SURGICAL HISTORY:  Past Surgical History:   Procedure Laterality Date     BIOPSY/EXCISION LYMPH NODE NEEDLE SUPERFICIAL  GROIN NODE     COLONOSCOPY N/A 2018    Procedure: COLONOSCOPY;  colonoscopy;  Surgeon: Grisel Lemus MD;  Location:  GI     HC REMOVAL OF TONSILS,12+ Y/O      Tonsils 12+y.o.     wisdom teeth extracted         ALLERGIES     Allergies   Allergen Reactions     Amoxicillin GI Disturbance     Pcn [Penicillins]        FAMILY HISTORY  Family History   Problem Relation Age of Onset     No Known Problems Mother      Heart Disease Father 54        MI in his mid 50's s/p CABG x 2      Breast Cancer Maternal Grandmother      Diabetes Paternal Grandmother         type 2      Diabetes Maternal Aunt          of complications in her mid 30's - type 1      No Known Problems Sister      No Known Problems Brother      No Known Problems Maternal Grandfather      No Known Problems Other        SOCIAL HISTORY  Social History     Tobacco Use     Smoking status: Never Smoker     Smokeless tobacco: Never Used   Substance Use Topics     Alcohol use: Yes     Alcohol/week: 0.0 standard drinks     Comment: 1 glass of wine or 2 /week      Drug use: No     Comment: no herbal meds either        ROS:   Review of Systems   All other systems reviewed and are negative.        EXAM:  LMP 2012   Physical Exam  Constitutional:       Appearance: Normal appearance.   HENT:      Head: Normocephalic and atraumatic.   Eyes:      General: No scleral icterus.     Extraocular Movements: Extraocular movements intact.      Pupils: Pupils are equal, round, and reactive to light.   Cardiovascular:      Comments: Clusters of spider veins predominantly involving  the mid left thigh.  No varicosities.  No edema.  Musculoskeletal:         General: Normal range of motion.      Cervical back: Normal range of motion.   Skin:     General: Skin is warm and dry.   Neurological:      General: No focal deficit present.      Mental Status: She is alert and oriented to person, place, and time. Mental status is at baseline.   Psychiatric:         Mood and Affect: Mood normal.         Behavior: Behavior normal.         Thought Content: Thought content normal.         Judgment: Judgment normal.         Labs:  LIPID RESULTS:  Lab Results   Component Value Date    CHOL 161 03/15/2022    CHOL 210 (H) 04/02/2018    HDL 51 03/15/2022    HDL 54 04/02/2018    LDL 92 03/15/2022     (H) 04/02/2018    TRIG 88 03/15/2022    TRIG 169 (H) 04/02/2018       CBC RESULTS:  No results found for: WBC, RBC, HGB, HCT, MCV, MCH, MCHC, RDW, PLT    BMP RESULTS:  Lab Results   Component Value Date     04/02/2018    POTASSIUM 4.2 04/02/2018    CHLORIDE 107 04/02/2018    CO2 29 04/02/2018    ANIONGAP 5 04/02/2018    GLC 89 04/02/2018    BUN 13 04/02/2018    CR 0.81 04/02/2018    GFRESTIMATED 74 04/02/2018    GFRESTBLACK 89 04/02/2018    LYNN 8.9 04/02/2018        A1C RESULTS:  No results found for: A1C      Imaging:  No results found for this or any previous visit (from the past 24 hour(s)).      Total length of this encounter was 15 minutes.        Yvan San MD          Again, thank you for allowing me to participate in the care of your patient.        Sincerely,        Yvan San MD

## 2022-04-19 NOTE — PROGRESS NOTES
After Visit Follow Up  Per Dr. San, patient was recommended to have 2 - 3 sessions at $400 of cosmetic sclerotherapy.    Reviewed with and gave to patient our vein clinic sclerotherapy packet of information which includes basic sclerotherapy information, pre-treatment instructions and post-treatment instructions. Also gave pt her compression hose Rx with her leg measurements to order thigh high PETITE compression hose online, gave [email or web address] information.    Patient in agreement with plan and had no further questions.    Debra Pichardo RN  [facility]  Vein Clinic

## 2022-04-19 NOTE — NURSING NOTE
Patient Reported symptoms:    Left Leg   Heaviness None of the time   Achiness Some of the time   Swelling None of the time   Throbbing None of the time   Itching None of the time   Appearance Slightly noticeable   Impact on work/activities Mildly reduced

## 2022-06-11 ENCOUNTER — HEALTH MAINTENANCE LETTER (OUTPATIENT)
Age: 57
End: 2022-06-11

## 2022-10-22 ENCOUNTER — HEALTH MAINTENANCE LETTER (OUTPATIENT)
Age: 57
End: 2022-10-22

## 2022-11-02 ENCOUNTER — OFFICE VISIT (OUTPATIENT)
Dept: VASCULAR SURGERY | Facility: CLINIC | Age: 57
End: 2022-11-02
Payer: COMMERCIAL

## 2022-11-02 DIAGNOSIS — I78.1 SPIDER VEINS: Primary | ICD-10-CM

## 2022-11-02 PROCEDURE — 36468 NJX SCLRSNT SPIDER VEINS: CPT | Performed by: SURGERY

## 2022-11-02 PROCEDURE — S9999 SALES TAX: HCPCS | Performed by: SURGERY

## 2022-11-02 NOTE — PROGRESS NOTES
[facility] Procedure Note    Gary Bowers  November 2, 2022    Gary Bowers is a 57 year old year old female. She presents for Sclerotherapy  .    LMP 04/01/2012     Flowsheet Data 4/19/2022   Side: Left       Sclerotherapy    Date/Time: 11/2/2022 10:30 AM  Performed by: Yvan San MD  Authorized by: Yvan San MD     Time out: Immediately prior to the procedure a time out was called    Preparation: Patient was prepped and draped in usual sterile fashion    Type:  Cosmetic  Session:  Full  Procedure side:  Bilateral  Solution/Amount:  .5 POLIDOCANOL and 1% POLIDOCANOL  Syringes:  4 syringes of 0.5% polidocanol; 1 syringe of 1% polidocanol.  Patient tolerance:  Patient tolerated the procedure well with no immediate complications  Wrap/Hose:  Hose    OPERATIVE DESCRIPTION:  Gary presents today for her first session of cosmetic injection sclerotherapy.  After informed consent was obtained she was placed on the table in a prone position.  I utilized a [employer] polarized light system.  She had a few reticular veins in the bilateral popliteal fossa.  Her posterior legs were cleansed in the usual manner using an alcohol solution.  I accessed the reticular veins using a 27-gauge butterfly needle and foamed 1% polidocanol.  There was excellent uptake of the solution.  I treated some additional spider telangiectasias in the bilateral popliteal fossae using foamed 0.5% polidocanol with small syringes and 30-gauge needles.    She was then repositioned into the supine position.  Her primary area of concern was on the anterior and lateral mid left thigh.  I focused on these areas using additional syringes of the foamed 0.5% polidocanol and 30-gauge needles.  I then treated additional areas on the medial aspect of the right knee and on the medial aspects of the bilateral ankles.    She tolerated the procedure without incident.  Her legs were cleansed and dried and she was helped into her  thigh-high compression stockings.  I reviewed postprocedural instructions.  Follow-up is to be with me in approximately 6 weeks for a sclerotherapy recheck.    Yvan San MD

## 2022-11-02 NOTE — LETTER
11/2/2022         RE: Gary Bowers  [address]   [place] [address]        Dear Colleague,    Thank you for referring your patient, Gary Bowers, to the [facility]IN [facility] [place]. Please see a copy of my visit note below.           [facility] Procedure Note    Gary Bowers  November 2, 2022    Gary Bowers is a 57 year old year old female. She presents for Sclerotherapy  .    LMP 04/01/2012     Flowsheet Data 4/19/2022   Side: Left       Sclerotherapy    Date/Time: 11/2/2022 10:30 AM  Performed by: Yvan San MD  Authorized by: Yvan San MD     Time out: Immediately prior to the procedure a time out was called    Preparation: Patient was prepped and draped in usual sterile fashion    Type:  Cosmetic  Session:  Full  Procedure side:  Bilateral  Solution/Amount:  .5 POLIDOCANOL and 1% POLIDOCANOL  Syringes:  4 syringes of 0.5% polidocanol; 1 syringe of 1% polidocanol.  Patient tolerance:  Patient tolerated the procedure well with no immediate complications  Wrap/Hose:  Hose    OPERATIVE DESCRIPTION:  Gary presents today for her first session of cosmetic injection sclerotherapy.  After informed consent was obtained she was placed on the table in a prone position.  I utilized a [employer] polarized light system.  She had a few reticular veins in the bilateral popliteal fossa.  Her posterior legs were cleansed in the usual manner using an alcohol solution.  I accessed the reticular veins using a 27-gauge butterfly needle and foamed 1% polidocanol.  There was excellent uptake of the solution.  I treated some additional spider telangiectasias in the bilateral popliteal fossae using foamed 0.5% polidocanol with small syringes and 30-gauge needles.    She was then repositioned into the supine position.  Her primary area of concern was on the anterior and lateral mid left thigh.  I focused on these areas using additional syringes of the foamed 0.5% polidocanol and  30-gauge needles.  I then treated additional areas on the medial aspect of the right knee and on the medial aspects of the bilateral ankles.    She tolerated the procedure without incident.  Her legs were cleansed and dried and she was helped into her thigh-high compression stockings.  I reviewed postprocedural instructions.  Follow-up is to be with me in approximately 6 weeks for a sclerotherapy recheck.    Yvan San MD      Again, thank you for allowing me to participate in the care of your patient.        Sincerely,        Yvan San MD

## 2022-12-14 DIAGNOSIS — Z79.890 POSTMENOPAUSAL HRT (HORMONE REPLACEMENT THERAPY): ICD-10-CM

## 2022-12-14 RX ORDER — NORETHINDRONE ACETATE AND ETHINYL ESTRADIOL .5; 2.5 MG/1; UG/1
TABLET ORAL
Qty: 84 TABLET | Refills: 0 | Status: SHIPPED | OUTPATIENT
Start: 2022-12-14 | End: 2023-03-24

## 2022-12-14 NOTE — TELEPHONE ENCOUNTER
"Requested Prescriptions   Pending Prescriptions Disp Refills     norethindrone-eth estradiol (FEMHRT LOW DOSE) 0.5-2.5 MG-MCG tablet [Pharmacy Med Name: NORETHIND-ETH ESTRAD 0.5-2.5] 84 tablet 3     Sig: TAKE 1 TABLET BY MOUTH EVERY DAY       Hormone Replacement Therapy Passed - 12/14/2022 12:43 AM        Passed - Blood pressure under 140/90 in past 12 months     BP Readings from Last 3 Encounters:   01/27/22 106/68   06/17/21 96/60   09/22/20 96/52                 Passed - Recent (12 mo) or future (30 days) visit within the authorizing provider's specialty     Patient has had an office visit with the authorizing provider or a provider within the authorizing providers department within the previous 12 mos or has a future within next 30 days. See \"Patient Info\" tab in inbasket, or \"Choose Columns\" in Meds & Orders section of the refill encounter.              Passed - Patient has mammogram in past 2 years on file if age 50-75        Passed - Medication is active on med list        Passed - Patient is 18 years of age or older        Passed - No active pregnancy on record        Passed - No positive pregnancy test on record in past 12 months           Refill approved  Appointment needed for further refills  Michelle Graham RN on 12/14/2022 at 11:38 AM    "

## 2023-01-11 ENCOUNTER — OFFICE VISIT (OUTPATIENT)
Dept: VASCULAR SURGERY | Facility: CLINIC | Age: 58
End: 2023-01-11
Payer: COMMERCIAL

## 2023-01-11 DIAGNOSIS — I78.1 SPIDER VEINS: Primary | ICD-10-CM

## 2023-01-11 PROCEDURE — S9999 SALES TAX: HCPCS | Performed by: SURGERY

## 2023-01-11 PROCEDURE — 36468 NJX SCLRSNT SPIDER VEINS: CPT | Performed by: SURGERY

## 2023-01-11 NOTE — PROGRESS NOTES
[facility] Procedure Note    Gary Bowers  January 11, 2023    Gary Bowers is a 57 year old year old female. She presents for Sclerotherapy  .    LMP 04/01/2012     Flowsheet Data 4/19/2022   Side: Left       Sclerotherapy    Date/Time: 1/11/2023 10:34 AM  Performed by: Yvan San MD  Authorized by: Yvan San MD     Time out: Immediately prior to the procedure a time out was called    Type:  Cosmetic  Session:  Limited  Procedure side:  Bilateral  Solution/Amount:  .5 POLIDOCANOL and 1% POLIDOCANOL  Syringes:  2 syringes of 0.5% polidocanol; 1 syringe of 1% polidocanol.  Patient tolerance:  Patient tolerated the procedure well with no immediate complications  Wrap/Hose:  Hose    OPERATIVE DESCRIPTION:  Gary presents today for her second session of cosmetic injection sclerotherapy.  At her last session on 11/2/2022 I focused primarily on her anterolateral left thigh and her bilateral popliteal fossae.  She had an excellent result without procedure but still has a few remnant spider veins on her lateral left thigh and her bilateral popliteal fossae.  After informed consent she was placed on the table in a prone position.  Her bilateral popliteal fossae were prepped with an alcohol solution.  I utilized a [employer] polarized light system.  There was one reticular vein in the left popliteal fossa that was accessed using a 27-gauge butterfly needle and injected with foamed 1% polidocanol.  I then accessed residual spider veins of the bilateral popliteal fossae using syringes of foamed 0.5% polidocanol and 30-gauge needles.  This was done with excellent uptake and no obvious extravasation.  She was flipped back over into the supine position and I treated residual spider veins on the anterolateral left thigh again with foamed 0.5% polidocanol.    She tolerated the procedure without incident.  Her legs were cleansed and dried and she was helped into her thigh-high compression stockings.   We reviewed postprocedural instructions.  She has an upcoming trip to [facility] [place] and I cautioned her about sun exposure.  She verbalizes full understanding to the above.  Sclerotherapy recheck will be with me in about 6 weeks.    Yvan San MD

## 2023-01-11 NOTE — LETTER
1/11/2023         RE: Gary Bowers  [address]   [place] [address]        Dear Colleague,    Thank you for referring your patient, Gary Bowers, to the [facility] [place]. Please see a copy of my visit note below.          [facility] Procedure Note    Gary Bowers  January 11, 2023    Gary Bowers is a 57 year old year old female. She presents for Sclerotherapy  .    LMP 04/01/2012     Flowsheet Data 4/19/2022   Side: Left       Sclerotherapy    Date/Time: 1/11/2023 10:34 AM  Performed by: Yvan San MD  Authorized by: Yvan San MD     Time out: Immediately prior to the procedure a time out was called    Type:  Cosmetic  Session:  Limited  Procedure side:  Bilateral  Solution/Amount:  .5 POLIDOCANOL and 1% POLIDOCANOL  Syringes:  2 syringes of 0.5% polidocanol; 1 syringe of 1% polidocanol.  Patient tolerance:  Patient tolerated the procedure well with no immediate complications  Wrap/Hose:  Hose    OPERATIVE DESCRIPTION:  Gary presents today for her second session of cosmetic injection sclerotherapy.  At her last session on 11/2/2022 I focused primarily on her anterolateral left thigh and her bilateral popliteal fossae.  She had an excellent result without procedure but still has a few remnant spider veins on her lateral left thigh and her bilateral popliteal fossae.  After informed consent she was placed on the table in a prone position.  Her bilateral popliteal fossae were prepped with an alcohol solution.  I utilized a [employer] polarized light system.  There was one reticular vein in the left popliteal fossa that was accessed using a 27-gauge butterfly needle and injected with foamed 1% polidocanol.  I then accessed residual spider veins of the bilateral popliteal fossae using syringes of foamed 0.5% polidocanol and 30-gauge needles.  This was done with excellent uptake and no obvious extravasation.  She was flipped back over into the supine  position and I treated residual spider veins on the anterolateral left thigh again with foamed 0.5% polidocanol.    She tolerated the procedure without incident.  Her legs were cleansed and dried and she was helped into her thigh-high compression stockings.  We reviewed postprocedural instructions.  She has an upcoming trip to [facility] and I cautioned her about sun exposure.  She verbalizes full understanding to the above.  Sclerotherapy recheck will be with me in about 6 weeks.    Yvan San MD      Again, thank you for allowing me to participate in the care of your patient.        Sincerely,        Yvan San MD

## 2023-03-09 DIAGNOSIS — Z79.890 POSTMENOPAUSAL HRT (HORMONE REPLACEMENT THERAPY): ICD-10-CM

## 2023-03-09 RX ORDER — NORETHINDRONE ACETATE AND ETHINYL ESTRADIOL .5; 2.5 MG/1; UG/1
TABLET ORAL
Qty: 84 TABLET | Refills: 0 | OUTPATIENT
Start: 2023-03-09

## 2023-03-09 NOTE — TELEPHONE ENCOUNTER
"Requested Prescriptions   Pending Prescriptions Disp Refills     norethindrone-eth estradiol (FEMHRT LOW DOSE) 0.5-2.5 MG-MCG tablet [Pharmacy Med Name: NORETHIND-ETH ESTRAD 0.5-2.5] 84 tablet 0     Sig: TAKE 1 TABLET BY MOUTH EVERY DAY. APPT NEEDED FOR FURTHER REFILLS       Hormone Replacement Therapy Failed - 3/9/2023 12:48 AM        Failed - Blood pressure under 140/90 in past 12 months     BP Readings from Last 3 Encounters:   01/27/22 106/68   06/17/21 96/60   09/22/20 96/52                 Failed - Recent (12 mo) or future (30 days) visit within the authorizing provider's specialty     Patient has had an office visit with the authorizing provider or a provider within the authorizing providers department within the previous 12 mos or has a future within next 30 days. See \"Patient Info\" tab in inbasket, or \"Choose Columns\" in Meds & Orders section of the refill encounter.              Passed - Patient has mammogram in past 2 years on file if age 50-75        Passed - Medication is active on med list        Passed - Patient is 18 years of age or older        Passed - No active pregnancy on record        Passed - No positive pregnancy test on record in past 12 months           Last Written Prescription Date:  12/14/22  Last Fill Quantity: 84,  # refills: 0   Last office visit: 1/27/2022 with prescribing provider:  Dr Valles   Future Office Visit:      Pt due for annual, no appt scheduled. Pt already received one month extension. Rx denied.   Bhavna Abbott RN on 3/9/2023 at 9:20 AM        "

## 2023-03-24 RX ORDER — NORETHINDRONE ACETATE AND ETHINYL ESTRADIOL .5; 2.5 MG/1; UG/1
1 TABLET ORAL DAILY
Qty: 84 TABLET | Refills: 1 | Status: SHIPPED | OUTPATIENT
Start: 2023-03-24

## 2023-03-24 NOTE — TELEPHONE ENCOUNTER
"Appt scheduled 7/11/23    Requested Prescriptions   Refused Prescriptions Disp Refills     norethindrone-eth estradiol (FEMHRT LOW DOSE) 0.5-2.5 MG-MCG tablet [Pharmacy Med Name: NORETHIND-ETH ESTRAD 0.5-2.5] 84 tablet 0     Sig: TAKE 1 TABLET BY MOUTH EVERY DAY. APPT NEEDED FOR FURTHER REFILLS       Hormone Replacement Therapy Failed - 3/9/2023 12:48 AM        Failed - Blood pressure under 140/90 in past 12 months     BP Readings from Last 3 Encounters:   01/27/22 106/68   06/17/21 96/60   09/22/20 96/52                 Failed - Recent (12 mo) or future (30 days) visit within the authorizing provider's specialty     Patient has had an office visit with the authorizing provider or a provider within the authorizing providers department within the previous 12 mos or has a future within next 30 days. See \"Patient Info\" tab in inbasket, or \"Choose Columns\" in Meds & Orders section of the refill encounter.              Passed - Patient has mammogram in past 2 years on file if age 50-75        Passed - Medication is active on med list        Passed - Patient is 18 years of age or older        Passed - No active pregnancy on record        Passed - No positive pregnancy test on record in past 12 months           Prescription approved per [facility] Refill Protocol.  Michelle Graham RN on 3/24/2023 at 12:10 PM    "

## 2023-03-30 ENCOUNTER — TELEPHONE (OUTPATIENT)
Dept: OBGYN | Facility: CLINIC | Age: 58
End: 2023-03-30
Payer: COMMERCIAL

## 2023-03-30 NOTE — TELEPHONE ENCOUNTER
norethindrone-eth estradiol (FEMHRT LOW DOSE) 0.5-2.5 MG-MCG tablet    Patient just went to pharmacy to  her Rx and it was $215.  Pt called insurance company and they recommended pt ask our clinic for a PA to be completed.    Routing to PA pool  Sabas Berumen RN on 3/30/2023 at 3:16 PM

## 2023-04-01 ENCOUNTER — HEALTH MAINTENANCE LETTER (OUTPATIENT)
Age: 58
End: 2023-04-01

## 2023-04-02 NOTE — TELEPHONE ENCOUNTER
PA Initiation    Medication: norethindrone-eth estradiol (FEMHRT LOW DOSE) 0.5-2.5 MG-MCG tablet   Insurance Company: [employer] ([facility]) - Phone [phone] Fax [phone]  Pharmacy [facility]illing the Rx: [id number] [place] [facility] [address] [address]  Filling Pharmacy Phone: [phone]  Filling Pharmacy Fax: [phone]  Start Date: 4/2/2023

## 2023-04-03 NOTE — TELEPHONE ENCOUNTER
Prior Authorization Not Needed per Insurance    Medication: norethindrone-eth estradiol (FEMHRT LOW DOSE) 0.5-2.5 MG-MCG tablet--NO PA NEEDED  Insurance Company: [employer] ([facility]) - Phone [phone] Fax [phone]  Expected CoPay:      [id number] [place] [id number] [facility] [address]  Pharmacy Notified: Yes  Patient Notified: Yes **Instructed pharmacy to notify patient when script is ready to /ship.**    Patient just has a high copay.

## 2023-07-07 ENCOUNTER — E-VISIT (OUTPATIENT)
Dept: URGENT CARE | Facility: CLINIC | Age: 58
End: 2023-07-07
Payer: COMMERCIAL

## 2023-07-07 DIAGNOSIS — B30.9 VIRAL CONJUNCTIVITIS: Primary | ICD-10-CM

## 2023-07-07 PROCEDURE — 99207 PR NO CHARGE LOS: CPT | Performed by: PHYSICIAN ASSISTANT

## 2023-07-07 NOTE — PATIENT INSTRUCTIONS
Thank you for choosing us for your care. Based on your symptoms and length of illness, I do not think that you need an antibiotic prescription at this time.  Please follow the care advise I've provided and use the prescribed medication to help relieve your symptoms. View your full visit summary for details by clicking on the link below.     If you're not feeling better within  2-3days, please respond to this message and we can consider if an antibiotic prescription is needed.  You can schedule an appointment right here in [facility], or call [phone]  If the visit is for the same symptoms as your eVisit, we'll refund the cost of your eVisit if seen within seven days     Conjunctivitis, Nonspecific    The membrane that covers the white part of your eye (the conjunctiva) is inflamed. Inflammation happens when your body responds to an injury, allergic reaction, infection, or illness. Symptoms of inflammation in the eye may include redness, irritation, itching, swelling, or burning. These symptoms should go away within the next 24 hours. Conjunctivitis may be related to a particle that was in your eye. If so, it may wash out with your tears or irrigation treatment. Being exposed to liquid chemicals or fumes may also cause this reaction.    Home care    Put a cold pack on the eye for 20 minutes at a time. This will reduce pain. To make a cold pack, put ice cubes in a plastic bag that seals at the top. Wrap the bag in a clean, thin towel or cloth.    Artificial tears may be prescribed to reduce irritation or redness. These should be used 3 to 4 times a day.    You may use acetaminophen or ibuprofen to control pain, unless another medicine was prescribed. If you have chronic liver or kidney disease, talk with your healthcare provider before using these medicines. Also talk with your provider if you have ever had a stomach ulcer or gastrointestinal bleeding.    If you wear contact lenses, don't use them until your  healthcare provider says it's OK.    Follow-up care  Follow up with your healthcare provider, or as advised.   When to seek medical advice  Call your healthcare provider right away if any of the following occur:     Eyelid swells more    Eye pain gets worse    Redness or drainage from the eye gets worse    Blurry vision gets worse, or you have increased sensitivity to light    Normal vision does not return within 24 to 48 hours  Sruthi last reviewed this educational content on 6/1/2022 2000-2022 [employer]. All rights reserved. This information is not intended as a substitute for professional medical care. Always follow your healthcare professional's instructions.

## 2023-07-08 ENCOUNTER — E-VISIT (OUTPATIENT)
Dept: URGENT CARE | Facility: CLINIC | Age: 58
End: 2023-07-08
Payer: COMMERCIAL

## 2023-07-08 DIAGNOSIS — H10.30 ACUTE CONJUNCTIVITIS, UNSPECIFIED ACUTE CONJUNCTIVITIS TYPE, UNSPECIFIED LATERALITY: Primary | ICD-10-CM

## 2023-07-08 PROCEDURE — 99421 OL DIG E/M SVC 5-10 MIN: CPT | Performed by: INTERNAL MEDICINE

## 2023-07-08 RX ORDER — POLYMYXIN B SULFATE AND TRIMETHOPRIM 1; 10000 MG/ML; [USP'U]/ML
SOLUTION OPHTHALMIC
Qty: 10 ML | Refills: 0 | Status: SHIPPED | OUTPATIENT
Start: 2023-07-08 | End: 2023-07-15

## 2023-07-10 ENCOUNTER — NURSE TRIAGE (OUTPATIENT)
Dept: NURSING | Facility: CLINIC | Age: 58
End: 2023-07-10
Payer: COMMERCIAL

## 2023-07-10 NOTE — TELEPHONE ENCOUNTER
"Pt did E-Visits 7/7 and 7/8 for itchy eyes and then with sticky/crusty drainage.  Diagnosis of conjunctivitis (which pt's son also had diagnosed just prior to pt's onset of eye symptoms).  Rx Polytrim gtts prescribed.    Pt states Polytrim gtts \"are not doing anything.\"  Both eyes remain \"red and puffy.\"  Sensitive to light.  Wearing sunglasses indoors.  Eye symptoms persist with \"burning discomfort, sometimes itching.\"  Now both eyes.    However, has been using drops for only 40 hours.  Not using yet as per Sig indicating \"3-to-5 days.\"  Has been \"wiping\" with washcloth at work.  Not apparently cleansing ideally as advised in self-care advice.    Pt has not seen her PCP in over two years.  Therefore Discussed two-fold plan:  -> call her health insurance customer service phone # on back of health ins card for advice on in-network urgent cares in or near [place] (since no open appt slots within [facility] system per checking w/central scheduling now).  -> keep using drops until able to be seen at an urgent care (since timeframe has not yet been 3-to-5 days for achieving effectiveness of drops)  -> begin the warm water cottonball treatments frequently (every 2-to-3 hours as feasible, and juan manuel prior to instilling gtts)  -> stop using drops if clearly worse afterward  -> seek same-day UC eval regardless of progression of symptoms now that symptoms feel worse    Pt agrees to plan.     Ale [facility] Nurse Advisor     Reason for Disposition    Looking at light causes MODERATE to SEVERE eye pain (i.e., photophobia)    Additional Information    Negative: Eye exposure to chemical or fumes    Negative: Redness of white of eye (sclera), but no pus or only a small amount of brief pus    Negative: SEVERE pain (e.g., excruciating)    Negative: Patient sounds very sick or weak to the triager    Negative: MODERATE eye pain (e.g., interferes with normal activities)    Protocols used: EYE - PUS OR [id number]      "

## 2023-09-18 DIAGNOSIS — Z79.890 POSTMENOPAUSAL HRT (HORMONE REPLACEMENT THERAPY): ICD-10-CM

## 2023-09-18 RX ORDER — NORETHINDRONE ACETATE AND ETHINYL ESTRADIOL .5; 2.5 MG/1; UG/1
1 TABLET ORAL DAILY
Qty: 84 TABLET | Refills: 1 | OUTPATIENT
Start: 2023-09-18

## 2023-09-18 ASSESSMENT — ENCOUNTER SYMPTOMS
HEMATURIA: 0
ABDOMINAL PAIN: 0
DYSURIA: 0
ARTHRALGIAS: 0
FREQUENCY: 0
HEMATOCHEZIA: 0
COUGH: 0
JOINT SWELLING: 0
SHORTNESS OF BREATH: 0
DIARRHEA: 0
NAUSEA: 0
PARESTHESIAS: 0
MYALGIAS: 0
HEADACHES: 0
BREAST MASS: 0
SORE THROAT: 0
EYE PAIN: 0
CONSTIPATION: 0
HEARTBURN: 0
PALPITATIONS: 0
CHILLS: 0
FEVER: 0
DIZZINESS: 0
WEAKNESS: 0
NERVOUS/ANXIOUS: 0

## 2023-09-18 NOTE — TELEPHONE ENCOUNTER
"Requested Prescriptions   Pending Prescriptions Disp Refills    norethindrone-eth estradiol (FEMHRT LOW DOSE) 0.5-2.5 MG-MCG tablet [Pharmacy Med Name: NORETHIND-ETH ESTRAD 0.5-2.5] 84 tablet 1     Sig: TAKE 1 TABLET BY MOUTH EVERY DAY       Hormone Replacement Therapy Failed - 9/18/2023  1:01 AM        Failed - Blood pressure under 140/90 in past 12 months     BP Readings from Last 3 Encounters:   01/27/22 106/68   06/17/21 96/60   09/22/20 96/52                 Failed - Recent (12 mo) or future (30 days) visit within the authorizing provider's specialty     Patient has had an office visit with the authorizing provider or a provider within the authorizing providers department within the previous 12 mos or has a future within next 30 days. See \"Patient Info\" tab in inbasket, or \"Choose Columns\" in Meds & Orders section of the refill encounter.              Passed - Patient has mammogram in past 2 years on file if age 50-75        Passed - Medication is active on med list        Passed - Patient is 18 years of age or older        Passed - No active pregnancy on record        Passed - No positive pregnancy test on record in past 12 months           Last Written Prescription Date:  3/24/23  Last Fill Quantity: 84,  # refills: 1   Last office visit: 1/27/2022 ; last virtual visit: Visit date not found with prescribing provider:  Hai   Future Office Visit:        Rx denied, appt with Dr Valles tomorrow  Sabas Berumen RN on 9/18/2023 at 2:07 PM      "

## 2023-09-19 ENCOUNTER — OFFICE VISIT (OUTPATIENT)
Dept: OBGYN | Facility: CLINIC | Age: 58
End: 2023-09-19
Payer: COMMERCIAL

## 2023-09-19 VITALS
DIASTOLIC BLOOD PRESSURE: 64 MMHG | HEIGHT: 64 IN | SYSTOLIC BLOOD PRESSURE: 108 MMHG | BODY MASS INDEX: 21.17 KG/M2 | WEIGHT: 124 LBS

## 2023-09-19 DIAGNOSIS — M81.0 OSTEOPOROSIS OF LUMBAR SPINE: ICD-10-CM

## 2023-09-19 DIAGNOSIS — Z79.890 POSTMENOPAUSAL HRT (HORMONE REPLACEMENT THERAPY): ICD-10-CM

## 2023-09-19 DIAGNOSIS — Z01.419 ENCOUNTER FOR GYNECOLOGICAL EXAMINATION WITHOUT ABNORMAL FINDING: Primary | ICD-10-CM

## 2023-09-19 PROCEDURE — 87624 HPV HI-RISK TYP POOLED RSLT: CPT | Performed by: OBSTETRICS & GYNECOLOGY

## 2023-09-19 PROCEDURE — G0145 SCR C/V CYTO,THINLAYER,RESCR: HCPCS | Performed by: OBSTETRICS & GYNECOLOGY

## 2023-09-19 PROCEDURE — 99213 OFFICE O/P EST LOW 20 MIN: CPT | Mod: 25 | Performed by: OBSTETRICS & GYNECOLOGY

## 2023-09-19 PROCEDURE — 99396 PREV VISIT EST AGE 40-64: CPT | Performed by: OBSTETRICS & GYNECOLOGY

## 2023-09-19 RX ORDER — NORETHINDRONE ACETATE 5 MG
2.5 TABLET ORAL DAILY
Qty: 45 TABLET | Refills: 3 | Status: SHIPPED | OUTPATIENT
Start: 2023-09-19 | End: 2024-09-20

## 2023-09-19 RX ORDER — ESTRADIOL 0.5 MG/1
0.5 TABLET ORAL DAILY
Qty: 90 TABLET | Refills: 3 | Status: SHIPPED | OUTPATIENT
Start: 2023-09-19 | End: 2024-09-20

## 2023-09-19 ASSESSMENT — ANXIETY QUESTIONNAIRES
5. BEING SO RESTLESS THAT IT IS HARD TO SIT STILL: NOT AT ALL
IF YOU CHECKED OFF ANY PROBLEMS ON THIS QUESTIONNAIRE, HOW DIFFICULT HAVE THESE PROBLEMS MADE IT FOR YOU TO DO YOUR WORK, TAKE CARE OF THINGS AT HOME, OR GET ALONG WITH OTHER PEOPLE: NOT DIFFICULT AT ALL
7. FEELING AFRAID AS IF SOMETHING AWFUL MIGHT HAPPEN: NOT AT ALL
GAD7 TOTAL SCORE: 0
3. WORRYING TOO MUCH ABOUT DIFFERENT THINGS: NOT AT ALL
1. FEELING NERVOUS, ANXIOUS, OR ON EDGE: NOT AT ALL
6. BECOMING EASILY ANNOYED OR IRRITABLE: NOT AT ALL
GAD7 TOTAL SCORE: 0
2. NOT BEING ABLE TO STOP OR CONTROL WORRYING: NOT AT ALL

## 2023-09-19 ASSESSMENT — PATIENT HEALTH QUESTIONNAIRE - PHQ9
SUM OF ALL RESPONSES TO PHQ QUESTIONS 1-9: 0
5. POOR APPETITE OR OVEREATING: NOT AT ALL

## 2023-09-19 NOTE — PROGRESS NOTES
Gary is a 58 year old  female who presents for annual exam.     Besides routine health maintenance,  she would like to Discuss HRT, discuss shingles vaccine-will get next time, discuss hair loss, declines flu shot    HPI:  Here today for yearly exam --doing well.  Postmenopausal. Has been on HRT for several years due to hot flushes/night sweats.  Started weaning process last year with lower dose --now doing lower dose every other day.  Has some hot flushes early morning but does okay the rest of the day and doesn't seem to be centered her off day.  Needs to change medication due to insurance change.  +SA but infrequent.  No signifant dryness or pain.  No bowel/bladder issues.  More frequency but no leaking or accidents.      ; works in real estate sales for [employer]; 2 children -son just graduated from [employer] and freshman at [employer]; daughter working on masters  -staying active with walking, running, elliptical and strength training  +mammo in 2 weeks --had to reschedule due to time conflict; +SBE --no issues  PCP -Dr. Scott but has not seen for years; had  normal fasting bloodwork last year with us; taking daily MVI and trying to remember her vitamin D  -bone scan last year showed osteoporosis at spine with T score of -2.8 (L hip -2.0, R hip -2.1) --never followed up as recommmended; discussed results and recommendation for medications today --not super excited for medication; knows her mom and aunt had trouble with fosamax --will think about and call if interested in re-visiting  -up to date on colonoscopy (2018)  -declines flu shot today      GYNECOLOGIC HISTORY:    Patient's last menstrual period was 2012.      Her current contraception method is: menopause.  She  reports that she has never smoked. She has never used smokeless tobacco.    Patient is sexually active.  STD testing offered?  Declined  Last PHQ-9 score on record =       2023    10:59 AM   PHQ-9 SCORE   PHQ-9 Total  Score 0     Last GAD7 score on record =       2023    10:59 AM   LELE-7 SCORE   Total Score 0     Alcohol Score = 5    HEALTH MAINTENANCE:  Cholesterol:  Cholesterol   Date Value Ref Range Status   03/15/2022 161 <200 mg/dL Final   2018 210 (H) <200 mg/dL Final     Comment:     Desirable:       <200 mg/dl   Last Mammo: One year ago, Result: Normal, Next Mammo: 2023  Pap:   Lab Results   Component Value Date    PAP NIL 2018    3/26/2018  Colonoscopy:  2018, Result: Normal, Next Colonoscopy: 10 years.  Dexa: 2022   FINDINGS:               Lumbar Spine (L1-L4)      T-score:  -2.8               Left Femoral Neck            T-score:  -2.0               Right Femoral Neck          T-score:  -2.1               Lumbar (L1-L4) BMD: 0.850  Previous: 0.925                          Total Hip Mean BMD: 0.774  Previous: 0.809    Health maintenance updated:  yes    HISTORY:  OB History    Para Term  AB Living   2 2 2 0 0 2   SAB IAB Ectopic Multiple Live Births   0 0 0 0 2      # Outcome Date GA Lbr Alexi/2nd Weight Sex Delivery Anes PTL Lv   2 Term 04 37w0d  3.232 kg (7 lb 2 oz) M    GINA      Name: CATHI   1 Term 97   3.005 kg (6 lb 10 oz) F    GINA      Name: ELENOR      Obstetric Comments    x 2 without complications       Patient Active Problem List   Diagnosis    CARDIOVASCULAR SCREENING; LDL GOAL LESS THAN 160    Osteoporosis of lumbar spine     Past Surgical History:   Procedure Laterality Date    BIOPSY/EXCISION LYMPH NODE NEEDLE SUPERFICIAL  GROIN NODE    COLONOSCOPY N/A 2018    Procedure: COLONOSCOPY;  colonoscopy;  Surgeon: Grisel Lemus MD;  Location:  GI    HC REMOVAL OF TONSILS,12+ Y/O      Tonsils 12+y.o.    wisdom teeth extracted        Social History     Tobacco Use    Smoking status: Never    Smokeless tobacco: Never   Substance Use Topics    Alcohol use: Yes     Alcohol/week: 0.0 standard drinks of alcohol     Comment: 1 glass of wine or  "2 /week       Problem (# of Occurrences) Relation (Name,Age of Onset)    Diabetes (2) Paternal Grandmother: type 2 , Maternal Aunt:  of complications in her mid 30's - type 1     Heart Disease (1) Father (54): MI in his mid 50's s/p CABG x 2     Breast Cancer (1) Maternal Grandmother    No Known Problems (6) Mother, Sister, Brother, Maternal Grandfather, Paternal Grandfather, Other              Current Outpatient Medications   Medication Sig    estradiol (ESTRACE) 0.5 MG tablet Take 1 tablet (0.5 mg) by mouth daily    norethindrone (AYGESTIN) 5 MG tablet Take 0.5 tablets (2.5 mg) by mouth daily    norethindrone-eth estradiol (FEMHRT LOW DOSE) 0.5-2.5 MG-MCG tablet Take 1 tablet by mouth daily     No current facility-administered medications for this visit.     Allergies   Allergen Reactions    Amoxicillin GI Disturbance    Pcn [Penicillins]        Past medical, surgical, social and family histories were reviewed and updated in EPIC.    ROS:   12 point review of systems negative other than symptoms noted below or in the HPI.  Genitourinary: Hot Flashes  Endocrine: Loss of Hair  No urinary frequency or dysuria, bladder or kidney problems    EXAM:  /64   Ht 1.619 m (5' 3.75\")   Wt 56.2 kg (124 lb)   LMP 2012   BMI 21.45 kg/m     BMI: Body mass index is 21.45 kg/m .    PHYSICAL EXAM:  Constitutional:   Appearance: Well nourished, well developed, alert, in no acute distress  Neck:  Lymph Nodes:  No lymphadenopathy present    Thyroid:  Gland size normal, nontender, no nodules or masses present  on palpation  Chest:  Respiratory Effort:  Breathing unlabored  Cardiovascular:    Heart: Auscultation:  Regular rate, normal rhythm, no murmurs present  Breasts: Palpation of Breasts and Axillae:  No masses present on palpation, no breast tenderness., Axillary Lymph Nodes:  No lymphadenopathy present., and No nodularity, asymmetry or nipple discharge bilaterally.  Gastrointestinal:   Abdominal Examination:  " Abdomen nontender to palpation, tone normal without rigidity or guarding, no masses present, umbilicus without lesions   Liver and Spleen:  No hepatomegaly present, liver nontender to palpation    Hernias:  No hernias present  Lymphatic: Lymph Nodes:  No other lymphadenopathy present  Skin:  General Inspection:  No rashes present, no lesions present, no areas of  discoloration  Neurologic:    Mental Status:  Oriented X3.  Normal strength and tone, sensory exam                grossly normal, mentation intact and speech normal.    Psychiatric:   Mentation appears normal and affect normal/bright.         Pelvic Exam:  External Genitalia:     Normal appearance for age, no discharge present, no tenderness present, no inflammatory lesions present, color normal  Vagina:     Normal vaginal vault without central or paravaginal defects, no discharge present, no inflammatory lesions present, no masses present  Bladder:     Nontender to palpation  Urethra:   Urethral Body:  Urethra palpation normal, urethra structural support normal   Urethral Meatus:  No erythema or lesions present  Cervix:     Appearance healthy, no lesions present, nontender to palpation, no bleeding present  Uterus:     Uterus: firm, normal sized and nontender, midplane in position.   Adnexa:     No adnexal tenderness present, no adnexal masses present  Perineum:     Perineum within normal limits, no evidence of trauma, no rashes or skin lesions present  Anus:     Anus within normal limits, no hemorrhoids present  Inguinal Lymph Nodes:     No lymphadenopathy present  Pubic Hair:     Normal pubic hair distribution for age  Genitalia and Groin:     No rashes present, no lesions present, no areas of discoloration, no masses present    COUNSELING:   Reviewed preventive health counseling, as reflected in patient instructions  Special attention given to:        Regular exercise       Healthy diet/nutrition       Osteoporosis prevention/bone health        Colorectal Cancer Screening       (Rhina)menopause management    BMI: Body mass index is 21.45 kg/m .      ASSESSMENT:  58 year old female with satisfactory annual exam.    ICD-10-CM    1. Encounter for gynecological examination without abnormal finding  Z01.419 Pap thin layer screen with HPV - recommended age 30 - 65 years      2. Postmenopausal HRT (hormone replacement therapy)  Z79.890 estradiol (ESTRACE) 0.5 MG tablet     norethindrone (AYGESTIN) 5 MG tablet      3. Osteoporosis of lumbar spine  M81.0           PLAN:  Patient Instructions   Follow up with your primary care provider for your other medical problems.  WH study was discussed in detail including current information.  The patient requests continuation of hormone therapy but will continue the weaning process.  Will now prescribe each component of her HRT individually due to insurance changes.  Continue self breast exam.  Increase physical activity and exercise.  Lab and pap smear results will be called to the patient.  Co-testing done today and will repeat in 5yrs if normal.  Usual safety and preventative measures counseling done.  Discussed Osteoporosis screening as well as calcium and Vitamin D recommendations.   With last year's bone scan, Gary is now in the range of osteoporosis.  I have recommended medication which she will think about and contact me if interested.  If declines medication, will repeat bone scan next year.  Will return in 2 weeks for mammogram and shingles vaccine.  **Additional time spent today discussing prior bone scan results and recommendation for osteoporosis medications; discussed etiology for osteoporosis and risks, discussed medications, etc    Daniela Valles MD  Answers submitted by the patient for this visit:  Annual Preventive Visit (Submitted on 9/18/2023)  Chief Complaint: Annual Exam:  Frequency of exercise:: 6-7 days/week  Getting at least 3 servings of Calcium per day:: Yes  Diet:: Regular (no  restrictions)  Taking medications regularly:: Yes  Medication side effects:: None  Bi-annual eye exam:: NO  Dental care twice a year:: Yes  Sleep apnea or symptoms of sleep apnea:: None  abdominal pain: No  Blood in stool: No  Blood in urine: No  chest pain: No  chills: No  congestion: No  constipation: No  cough: No  diarrhea: No  dizziness: No  ear pain: No  eye pain: No  nervous/anxious: No  fever: No  frequency: No  genital sores: No  headaches: No  hearing loss: No  heartburn: No  arthralgias: No  joint swelling: No  peripheral edema: No  mood changes: No  myalgias: No  nausea: No  dysuria: No  palpitations: No  Skin sensation changes: No  sore throat: No  urgency: No  rash: No  shortness of breath: No  visual disturbance: No  weakness: No  pelvic pain: No  vaginal bleeding: No  vaginal discharge: No  tenderness: No  breast mass: No  breast discharge: No  Additional concerns today:: Yes  Exercise outside of work (Submitted on 9/18/2023)  Chief Complaint: Annual Exam:  Duration of exercise:: 45-60 minutes

## 2023-09-19 NOTE — PATIENT INSTRUCTIONS
Follow up with your primary care provider for your other medical problems.  WHI study was discussed in detail including current information.  The patient requests continuation of hormone therapy but will continue the weaning process.  Will now prescribe each component of her HRT individually due to insurance changes.  Continue self breast exam.  Increase physical activity and exercise.  Lab and pap smear results will be called to the patient.  Co-testing done today and will repeat in 5yrs if normal.  Usual safety and preventative measures counseling done.  Discussed Osteoporosis screening as well as calcium and Vitamin D recommendations.   With last year's bone scan, Gary is now in the range of osteoporosis.  I have recommended medication which she will think about and contact me if interested.  If declines medication, will repeat bone scan next year.  Will return in 2 weeks for mammogram and shingles vaccine.

## 2023-09-21 LAB
BKR LAB AP GYN ADEQUACY: NORMAL
BKR LAB AP GYN INTERPRETATION: NORMAL
BKR LAB AP HPV REFLEX: NORMAL
BKR LAB AP PREVIOUS ABNORMAL: NORMAL
PATH REPORT.COMMENTS IMP SPEC: NORMAL
PATH REPORT.COMMENTS IMP SPEC: NORMAL
PATH REPORT.RELEVANT HX SPEC: NORMAL

## 2023-09-22 LAB
HUMAN PAPILLOMA VIRUS 16 DNA: NEGATIVE
HUMAN PAPILLOMA VIRUS 18 DNA: NEGATIVE
HUMAN PAPILLOMA VIRUS FINAL DIAGNOSIS: NORMAL
HUMAN PAPILLOMA VIRUS OTHER HR: NEGATIVE

## 2023-09-25 DIAGNOSIS — Z23 NEED FOR SHINGLES VACCINE: Primary | ICD-10-CM

## 2023-09-28 ENCOUNTER — ALLIED HEALTH/NURSE VISIT (OUTPATIENT)
Dept: OBGYN | Facility: CLINIC | Age: 58
End: 2023-09-28
Payer: COMMERCIAL

## 2023-09-28 ENCOUNTER — ANCILLARY PROCEDURE (OUTPATIENT)
Dept: MAMMOGRAPHY | Facility: CLINIC | Age: 58
End: 2023-09-28
Payer: COMMERCIAL

## 2023-09-28 DIAGNOSIS — Z23 NEED FOR SHINGLES VACCINE: ICD-10-CM

## 2023-09-28 DIAGNOSIS — Z12.31 VISIT FOR SCREENING MAMMOGRAM: ICD-10-CM

## 2023-09-28 DIAGNOSIS — Z23 NEED FOR VACCINATION: Primary | ICD-10-CM

## 2023-09-28 PROCEDURE — 77067 SCR MAMMO BI INCL CAD: CPT | Mod: TC | Performed by: RADIOLOGY

## 2023-09-28 PROCEDURE — 77063 BREAST TOMOSYNTHESIS BI: CPT | Mod: TC | Performed by: RADIOLOGY

## 2023-09-28 PROCEDURE — 90471 IMMUNIZATION ADMIN: CPT

## 2023-09-28 PROCEDURE — 99207 PR NO CHARGE NURSE ONLY: CPT

## 2023-09-28 PROCEDURE — 90750 HZV VACC RECOMBINANT IM: CPT

## 2023-09-28 NOTE — CONFIDENTIAL NOTE
Prior to immunization administration, verified patients identity using patient s name and date of birth. Please see Immunization Activity for additional information.     Screening Questionnaire for Adult Immunization    Are you sick today?   No   Do you have allergies to medications, food, a vaccine component or latex?   No   Have you ever had a serious reaction after receiving a vaccination?   No   Do you have a long-term health problem with heart, lung, kidney, or metabolic disease (e.g., diabetes), asthma, a blood disorder, no spleen, complement component deficiency, a cochlear implant, or a spinal fluid leak?  Are you on long-term aspirin therapy?   No   Do you have cancer, leukemia, HIV/AIDS, or any other immune system problem?   No   Do you have a parent, brother, or sister with an immune system problem?   No   In the past 3 months, have you taken medications that affect  your immune system, such as prednisone, other steroids, or anticancer drugs; drugs for the treatment of rheumatoid arthritis, Crohn s disease, or psoriasis; or have you had radiation treatments?   No   Have you had a seizure, or a brain or other nervous system problem?   No   During the past year, have you received a transfusion of blood or blood    products, or been given immune (gamma) globulin or antiviral drug?   No   For women: Are you pregnant or is there a chance you could become       pregnant during the next month?   No   Have you received any vaccinations in the past 4 weeks?   No     Immunization questionnaire answers were all negative.    I have reviewed the following standing orders:   This patient is due and qualifies for the Zoster vaccine.    Click here for Zoster Standing Order    I have reviewed the vaccines inclusion and exclusion criteria; No concerns regarding eligibility.     Patient instructed to remain in clinic for 15 minutes afterwards, and to report any adverse reactions.     Screening performed by Bhavna Abbott  RN on 9/28/2023 at 12:04 PM.

## 2023-10-19 ENCOUNTER — MYC MEDICAL ADVICE (OUTPATIENT)
Dept: OBGYN | Facility: CLINIC | Age: 58
End: 2023-10-19
Payer: COMMERCIAL

## 2023-10-19 NOTE — TELEPHONE ENCOUNTER
Pt wondering if you can review her prescription chance since they had their insurance change and be sure that the dosing is correct?    Photos included in [employer]    9/19/23: Valles  I study was discussed in detail including current information.  The patient requests continuation of hormone therapy but will continue the weaning process.  Will now prescribe each component of her HRT individually due to insurance changes.     Routing pt [id number] message to provider to advise.  Sabas Berumen RN on 10/19/2023 at 4:51 PM

## 2024-01-17 ENCOUNTER — ALLIED HEALTH/NURSE VISIT (OUTPATIENT)
Dept: OBGYN | Facility: CLINIC | Age: 59
End: 2024-01-17
Payer: COMMERCIAL

## 2024-01-17 DIAGNOSIS — Z23 NEED FOR SHINGLES VACCINE: Primary | ICD-10-CM

## 2024-01-17 PROCEDURE — 99207 PR NO CHARGE NURSE ONLY: CPT

## 2024-01-17 PROCEDURE — 90750 HZV VACC RECOMBINANT IM: CPT

## 2024-01-17 PROCEDURE — 90471 IMMUNIZATION ADMIN: CPT

## 2024-01-17 NOTE — PROGRESS NOTES
Prior to immunization administration, verified patients identity using patient s name and date of birth. Please see Immunization Activity for additional information.     Screening Questionnaire for Adult Immunization    Are you sick today?   No   Do you have allergies to medications, food, a vaccine component or latex?   Amoxicillin   Have you ever had a serious reaction after receiving a vaccination?   No   Do you have a long-term health problem with heart, lung, kidney, or metabolic disease (e.g., diabetes), asthma, a blood disorder, no spleen, complement component deficiency, a cochlear implant, or a spinal fluid leak?  Are you on long-term aspirin therapy?   No   Do you have cancer, leukemia, HIV/AIDS, or any other immune system problem?   No   Do you have a parent, brother, or sister with an immune system problem?   No   In the past 3 months, have you taken medications that affect  your immune system, such as prednisone, other steroids, or anticancer drugs; drugs for the treatment of rheumatoid arthritis, Crohn s disease, or psoriasis; or have you had radiation treatments?   No   Have you had a seizure, or a brain or other nervous system problem?   No   During the past year, have you received a transfusion of blood or blood    products, or been given immune (gamma) globulin or antiviral drug?   No   For women: Are you pregnant or is there a chance you could become       pregnant during the next month?   No   Have you received any vaccinations in the past 4 weeks?   No     Immunization questionnaire answers were all negative.    I have reviewed the following standing orders:   This patient is due and qualifies for the Zoster vaccine.    Click here for Zoster Standing Order    I have reviewed the vaccines inclusion and exclusion criteria; No concerns regarding eligibility.     Patient instructed to remain in clinic for 15 minutes afterwards, and to report any adverse reactions.     Screening performed by Payal SANCHEZ  THEE Allen on 1/17/2024 at 2:06 PM.

## 2024-09-20 DIAGNOSIS — Z79.890 POSTMENOPAUSAL HRT (HORMONE REPLACEMENT THERAPY): ICD-10-CM

## 2024-09-20 RX ORDER — ESTRADIOL 0.5 MG/1
0.5 TABLET ORAL DAILY
Qty: 90 TABLET | Refills: 0 | Status: SHIPPED | OUTPATIENT
Start: 2024-09-20

## 2024-09-20 RX ORDER — NORETHINDRONE ACETATE 5 MG
2.5 TABLET ORAL DAILY
Qty: 45 TABLET | Refills: 0 | Status: SHIPPED | OUTPATIENT
Start: 2024-09-20

## 2024-09-20 NOTE — TELEPHONE ENCOUNTER
Requested Prescriptions   Pending Prescriptions Disp Refills    estradiol (ESTRACE) 0.5 MG tablet [Pharmacy Med Name: ESTRADIOL 0.5 MG TABLET] 90 tablet 3     Sig: TAKE 1 TABLET BY MOUTH EVERY DAY       Contraceptives Protocol Failed - 9/20/2024 12:51 AM        Failed - Recent (12 mo) or future (30 days) visit within the authorizing provider's specialty     The patient must have completed an in-person or virtual visit within the past 12 months or has a future visit scheduled within the next 90 days with the authorizing provider s specialty.  Urgent care and e-visits do not quality as an office visit for this protocol.          Failed - Medication indicated for associated diagnosis     Medication is associated with one or more of the following diagnoses:  Contraception  Acne  Dysmenorrhea  Menorrhagia  Amenorrhea  PCOS  Premenstrual Dysphoric Disorder  Irregular menses  Endometriosis          Passed - Patient is not a current smoker if age is 35 or older        Passed - Medication is active on med list        Passed - No active pregnancy on record        Passed - No positive pregnancy test in past 12 months       Hormone Replacement Therapy Failed - 9/20/2024 12:51 AM        Failed - Blood pressure under 140/90 in past 12 months     BP Readings from Last 3 Encounters:   09/19/23 108/64   01/27/22 106/68   06/17/21 96/60       No data recorded            Failed - Recent (12 mo) or future (90 days) visit within the authorizing provider's specialty     The patient must have completed an in-person or virtual visit within the past 12 months or has a future visit scheduled within the next 90 days with the authorizing provider s specialty.  Urgent care and e-visits do not quality as an office visit for this protocol.          Failed - Medication indicated for associated diagnosis     The medication is prescribed for one or more of the following conditions:    Menopause   Vulva/Vaginal atrophy   Low Estrogen   Gender  Dysphoria   Male to Female transgender          Passed - Medication is active on med list        Passed - Patient is 18 years of age or older        Passed - No active pregnancy on record        Passed - No positive pregnancy test on record in past 12 months       Hormone Replacement Therapy (vaginal) Failed - 9/20/2024 12:51 AM        Failed - Recent (12 mo) or future (90 days) visit within the authorizing provider's specialty     The patient must have completed an in-person or virtual visit within the past 12 months or has a future visit scheduled within the next 90 days with the authorizing provider s specialty.  Urgent care and e-visits do not quality as an office visit for this protocol.          Passed - Medication is active on med list        Passed - Medication indicated for associated diagnosis     Medication is associated with one or more of the following diagnoses:     Menopause   Vulva/Vaginal atrophy   UTI prophylaxis          Passed - Patient is 18 years of age or older        Passed - No active pregnancy on record        Passed - No positive pregnancy test on record in past 12 months          Last Written Prescription Date:  9/19/23  Last Fill Quantity: 90,  # refills: 3   Last office visit: 9/19/2023 ; last virtual visit: Visit date not found with prescribing provider:  Hai   Future Office Visit:          norethindrone (AYGESTIN) 5 MG tablet [Pharmacy Med Name: NORETHINDRONE 5 MG TABLET] 45 tablet 3     Sig: TAKE 0.5 TABLETS BY MOUTH DAILY.       There is no refill protocol information for this order        Last Written Prescription Date:  9/19/23  Last Fill Quantity: 45,  # refills: 3   Last office visit: 9/19/2023 ; last virtual visit: Visit date not found with prescribing provider:  Hai   Future Office Visit:      Medication is being filled for 1 time refill only due to:  Patient needs to be seen because it has been more than one year since last visit.  Routing to schedulers  Sabas  ROCKY Berumen on 9/20/2024 at 10:17 AM   WE EMILYN

## 2024-10-20 ENCOUNTER — HEALTH MAINTENANCE LETTER (OUTPATIENT)
Age: 59
End: 2024-10-20

## 2024-11-15 NOTE — PROGRESS NOTES
"Gary is a 59 year old  female who presents for annual exam.     Besides routine health maintenance,  she would like to discuss medications.    HPI:  Here today for yearly exam --doing well.  Postmenopausal.  No vb/spotting.  Has been on HRT for several years --has weaned down to every other day dosing in the last year and doing well.   Still having occ hot flushes but \"not as bad\".  Willing to try 1/2 dose every other day.  +SA --having some dryness with sex.  Not using lubricant but will try.  No bowel/bladder issues.  Started probiotic and finding bowels to be much more consistent.  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 Scott but prefers to do labs with us; will return when fasting  -up to date on colonoscopy  -hx osteoporosis on last bone scan in  but opted to not start treatment; will repeat this year  -declines flu and covid vaccines      GYNECOLOGIC HISTORY:    Patient's last menstrual period was 2012.    Her current contraception method is: menopause.  She  reports that she has never smoked. She has never used smokeless tobacco.    Patient is sexually active.  STD testing offered?  Declined  Last PHQ-9 score on record =       2024     2:14 PM   PHQ-9 SCORE   PHQ-9 Total Score 0     Last GAD7 score on record =       2024     2:14 PM   LELE-7 SCORE   Total Score 0     Alcohol Score = 2    HEALTH MAINTENANCE:  Cholesterol:   Cholesterol   Date Value Ref Range Status   03/15/2022 161 <200 mg/dL Final   2018 210 (H) <200 mg/dL Final     Comment:     Desirable:       <200 mg/dl   Last Mammo: One year ago, Result: Normal, Next Mammo: Today   Pap:   Lab Results "   Component Value Date    GYNINTERP  2023     Negative for Intraepithelial Lesion or Malignancy (NILM)    PAP NIL 2018     Colonoscopy:  , Result: Normal, Next Colonoscopy: 4 more years.  Dexa:    FINDINGS:               Lumbar Spine (L1-L4)      T-score:  -2.8               Left Femoral Neck            T-score:  -2.0               Right Femoral Neck          T-score:  -2.1               Lumbar (L1-L4) BMD: 0.850  Previous: 0.925                          Total Hip Mean BMD: 0.774  Previous: 0.809    Health maintenance updated:  yes    HISTORY:  OB History    Para Term  AB Living   2 2 2 0 0 2   SAB IAB Ectopic Multiple Live Births   0 0 0 0 2      # Outcome Date GA Lbr Alexi/2nd Weight Sex Type Anes PTL Lv   2 Term 04 37w0d  3.232 kg (7 lb 2 oz) M    GINA      Name: CATHI   1 Term 97   3.005 kg (6 lb 10 oz) F    GINA      Name: ELENOR      Obstetric Comments    x 2 without complications       Patient Active Problem List   Diagnosis    CARDIOVASCULAR SCREENING; LDL GOAL LESS THAN 160    Osteoporosis of lumbar spine     Past Surgical History:   Procedure Laterality Date    BIOPSY/EXCISION LYMPH NODE NEEDLE SUPERFICIAL  GROIN NODE    COLONOSCOPY N/A 2018    Procedure: COLONOSCOPY;  colonoscopy;  Surgeon: Grisel Lemus MD;  Location: [facility]    HC REMOVAL OF TONSILS,12+ Y/O      Tonsils 12+y.o.    wisdom teeth extracted        Social History     Tobacco Use    Smoking status: Never    Smokeless tobacco: Never   Substance Use Topics    Alcohol use: Yes     Alcohol/week: 0.0 standard drinks of alcohol     Comment: 1 glass of wine or 2 /week       Problem (# of Occurrences) Relation (Name,Age of Onset)    Diabetes (2) Paternal Grandmother: type 2 , Maternal Aunt:  of complications in her mid 30's - type 1     Heart Disease (1) Father (54): MI in his mid 50's s/p CABG x 2     Breast Cancer (1) Maternal Grandmother    No Known Problems (6) Mother, Sister,  "Brother, Maternal Grandfather, Paternal Grandfather, Other              Current Outpatient Medications   Medication Sig Dispense Refill    estradiol (ESTRACE) 0.5 MG tablet Take 0.5 tablets (0.25 mg) by mouth every other day. 90 tablet 1    norethindrone (AYGESTIN) 5 MG tablet Take 0.5 tablets (2.5 mg) by mouth every other day. 90 tablet 1     No current facility-administered medications for this visit.     Allergies   Allergen Reactions    Amoxicillin GI Disturbance    Pcn [Penicillins]        Past medical, surgical, social and family histories were reviewed and updated in EPIC.    ROS:   12 point review of systems negative other than symptoms noted below or in the HPI.  No urinary frequency or dysuria, bladder or kidney problems    EXAM:  /74   Ht 1.626 m (5' 4\")   Wt 59.9 kg (132 lb)   LMP 04/01/2012   BMI 22.66 kg/m     BMI: Body mass index is 22.66 kg/m .    PHYSICAL EXAM:  Constitutional:   Appearance: Well nourished, well developed, alert, in no acute distress  Neck:  Lymph Nodes:  No lymphadenopathy present    Thyroid:  Gland size normal, nontender, no nodules or masses present  on palpation  Chest:  Respiratory Effort:  Breathing unlabored  Cardiovascular:    Heart: Auscultation:  Regular rate, normal rhythm, no murmurs present  Breasts: Palpation of Breasts and Axillae:  No masses present on palpation, no breast tenderness., Axillary Lymph Nodes:  No lymphadenopathy present., and No nodularity, asymmetry or nipple discharge bilaterally.  Gastrointestinal:   Abdominal Examination:  Abdomen nontender to palpation, tone normal without rigidity or guarding, no masses present, umbilicus without lesions   Liver and Spleen:  No hepatomegaly present, liver nontender to palpation    Hernias:  No hernias present  Lymphatic: Lymph Nodes:  No other lymphadenopathy present  Skin:  General Inspection:  No rashes present, no lesions present, no areas of  discoloration  Neurologic:    Mental Status:  Oriented " X3.  Normal strength and tone, sensory exam                grossly normal, mentation intact and speech normal.    Psychiatric:   Mentation appears normal and affect normal/bright.         Pelvic Exam:  External Genitalia:     Normal appearance for age, no discharge present, no tenderness present, no inflammatory lesions present, color normal  Vagina:     Normal vaginal vault without central or paravaginal defects, ATROPHIC  Bladder:     Nontender to palpation  Urethra:   Urethral Body:  Urethra palpation normal, urethra structural support normal   Urethral Meatus:  No erythema or lesions present  Cervix:     Appearance healthy, no lesions present, nontender to palpation, no bleeding present  Uterus:     Nontender to palpation, no masses present, position anteflexed, mobility: normal  Adnexa:     No adnexal tenderness present, no adnexal masses present  Perineum:     Perineum within normal limits, no evidence of trauma, no rashes or skin lesions present  Inguinal Lymph Nodes:     No lymphadenopathy present    COUNSELING:   Reviewed preventive health counseling, as reflected in patient instructions  Special attention given to:        Regular exercise       Healthy diet/nutrition       Osteoporosis prevention/bone health       (Rhina)menopause management    BMI: Body mass index is 22.66 kg/m .      ASSESSMENT:  59 year old female with satisfactory annual exam.    ICD-10-CM    1. Encounter for gynecological examination without abnormal finding  Z01.419       2. Postmenopausal HRT (hormone replacement therapy)  Z79.890 estradiol (ESTRACE) 0.5 MG tablet     norethindrone (AYGESTIN) 5 MG tablet      3. Osteoporosis of lumbar spine  M81.0 DX Bone Density      4. Encounter for lipid screening for cardiovascular disease  Z13.220 Lipid panel reflex to direct LDL Fasting    Z13.6       5. Screening for diabetes mellitus  Z13.1 Glucose, whole blood          PLAN:  Patient Instructions   Follow up with your primary care provider  for your other medical problems.  Continue self breast exam.  WHI study was discussed in detail including current information.  The patient requests continuation of hormone therapy but agrees to take 1/2 dose of her estrace every other day to continue the weaning process.  Will continue with full tablet of aygestin every other day.     Increase physical activity and exercise.  Lab results will be called to the patient.  Usual safety and preventative measures counseling done.  Last pap smear (2023) was normal and negative for the DNA of high risk HPV subtypes.  No pap was obtained this year.  This was discussed with the patient and she agrees with the plan.  Discussed Osteoporosis screening as well as calcium and Vitamin D recommendations.  Will plan to repeat bone scan this year due to history of osteoporosis.         Daniela Valles MD

## 2024-11-20 ENCOUNTER — ANCILLARY PROCEDURE (OUTPATIENT)
Dept: MAMMOGRAPHY | Facility: CLINIC | Age: 59
End: 2024-11-20
Payer: COMMERCIAL

## 2024-11-20 ENCOUNTER — OFFICE VISIT (OUTPATIENT)
Dept: OBGYN | Facility: CLINIC | Age: 59
End: 2024-11-20
Payer: COMMERCIAL

## 2024-11-20 VITALS
SYSTOLIC BLOOD PRESSURE: 118 MMHG | WEIGHT: 132 LBS | HEIGHT: 64 IN | DIASTOLIC BLOOD PRESSURE: 74 MMHG | BODY MASS INDEX: 22.53 KG/M2

## 2024-11-20 DIAGNOSIS — M81.0 OSTEOPOROSIS OF LUMBAR SPINE: ICD-10-CM

## 2024-11-20 DIAGNOSIS — Z13.1 SCREENING FOR DIABETES MELLITUS: ICD-10-CM

## 2024-11-20 DIAGNOSIS — Z01.419 ENCOUNTER FOR GYNECOLOGICAL EXAMINATION WITHOUT ABNORMAL FINDING: Primary | ICD-10-CM

## 2024-11-20 DIAGNOSIS — Z79.890 POSTMENOPAUSAL HRT (HORMONE REPLACEMENT THERAPY): ICD-10-CM

## 2024-11-20 DIAGNOSIS — Z12.31 VISIT FOR SCREENING MAMMOGRAM: ICD-10-CM

## 2024-11-20 DIAGNOSIS — Z13.220 ENCOUNTER FOR LIPID SCREENING FOR CARDIOVASCULAR DISEASE: ICD-10-CM

## 2024-11-20 DIAGNOSIS — Z13.6 ENCOUNTER FOR LIPID SCREENING FOR CARDIOVASCULAR DISEASE: ICD-10-CM

## 2024-11-20 PROCEDURE — 77067 SCR MAMMO BI INCL CAD: CPT | Mod: TC | Performed by: RADIOLOGY

## 2024-11-20 PROCEDURE — 77063 BREAST TOMOSYNTHESIS BI: CPT | Mod: TC | Performed by: RADIOLOGY

## 2024-11-20 PROCEDURE — 99396 PREV VISIT EST AGE 40-64: CPT | Performed by: OBSTETRICS & GYNECOLOGY

## 2024-11-20 RX ORDER — ESTRADIOL 0.5 MG/1
0.25 TABLET ORAL EVERY OTHER DAY
Qty: 90 TABLET | Refills: 1 | Status: SHIPPED | OUTPATIENT
Start: 2024-11-20

## 2024-11-20 RX ORDER — NORETHINDRONE 5 MG/1
2.5 TABLET ORAL EVERY OTHER DAY
Qty: 90 TABLET | Refills: 1 | Status: SHIPPED | OUTPATIENT
Start: 2024-11-20

## 2024-11-20 ASSESSMENT — PATIENT HEALTH QUESTIONNAIRE - PHQ9
SUM OF ALL RESPONSES TO PHQ QUESTIONS 1-9: 0
5. POOR APPETITE OR OVEREATING: NOT AT ALL

## 2024-11-20 ASSESSMENT — ANXIETY QUESTIONNAIRES
IF YOU CHECKED OFF ANY PROBLEMS ON THIS QUESTIONNAIRE, HOW DIFFICULT HAVE THESE PROBLEMS MADE IT FOR YOU TO DO YOUR WORK, TAKE CARE OF THINGS AT HOME, OR GET ALONG WITH OTHER PEOPLE: NOT DIFFICULT AT ALL
6. BECOMING EASILY ANNOYED OR IRRITABLE: NOT AT ALL
1. FEELING NERVOUS, ANXIOUS, OR ON EDGE: NOT AT ALL
GAD7 TOTAL SCORE: 0
2. NOT BEING ABLE TO STOP OR CONTROL WORRYING: NOT AT ALL
3. WORRYING TOO MUCH ABOUT DIFFERENT THINGS: NOT AT ALL
GAD7 TOTAL SCORE: 0
5. BEING SO RESTLESS THAT IT IS HARD TO SIT STILL: NOT AT ALL
7. FEELING AFRAID AS IF SOMETHING AWFUL MIGHT HAPPEN: NOT AT ALL

## 2024-11-20 NOTE — PATIENT INSTRUCTIONS
Follow up with your primary care provider for your other medical problems.  Continue self breast exam.  WHI study was discussed in detail including current information.  The patient requests continuation of hormone therapy but agrees to take 1/2 dose of her estrace every other day to continue the weaning process.  Will continue with full tablet of aygestin every other day.     Increase physical activity and exercise.  Lab results will be called to the patient.  Usual safety and preventative measures counseling done.  Last pap smear (2023) was normal and negative for the DNA of high risk HPV subtypes.  No pap was obtained this year.  This was discussed with the patient and she agrees with the plan.  Discussed Osteoporosis screening as well as calcium and Vitamin D recommendations.  Will plan to repeat bone scan this year due to history of osteoporosis.

## 2024-11-25 ENCOUNTER — HOSPITAL ENCOUNTER (OUTPATIENT)
Dept: MAMMOGRAPHY | Facility: CLINIC | Age: 59
Discharge: HOME | End: 2024-11-25
Attending: OBSTETRICS & GYNECOLOGY | Admitting: OBSTETRICS & GYNECOLOGY
Payer: COMMERCIAL

## 2024-11-25 DIAGNOSIS — R92.8 ABNORMAL MAMMOGRAM: ICD-10-CM

## 2024-11-25 PROCEDURE — 76642 ULTRASOUND BREAST LIMITED: CPT | Mod: LT

## 2024-12-10 ENCOUNTER — ANCILLARY PROCEDURE (OUTPATIENT)
Dept: BONE DENSITY | Facility: CLINIC | Age: 59
End: 2024-12-10
Attending: OBSTETRICS & GYNECOLOGY
Payer: COMMERCIAL

## 2024-12-10 ENCOUNTER — LAB (OUTPATIENT)
Dept: LAB | Facility: CLINIC | Age: 59
End: 2024-12-10
Attending: OBSTETRICS & GYNECOLOGY
Payer: COMMERCIAL

## 2024-12-10 DIAGNOSIS — Z13.220 ENCOUNTER FOR LIPID SCREENING FOR CARDIOVASCULAR DISEASE: ICD-10-CM

## 2024-12-10 DIAGNOSIS — Z13.6 ENCOUNTER FOR LIPID SCREENING FOR CARDIOVASCULAR DISEASE: ICD-10-CM

## 2024-12-10 DIAGNOSIS — M81.0 OSTEOPOROSIS OF LUMBAR SPINE: ICD-10-CM

## 2024-12-10 DIAGNOSIS — Z13.1 SCREENING FOR DIABETES MELLITUS: ICD-10-CM

## 2024-12-10 LAB
CHOLEST SERPL-MCNC: 180 MG/DL
FASTING STATUS PATIENT QL REPORTED: YES
GLUCOSE BLD-MCNC: 86 MG/DL (ref 60–99)
HDLC SERPL-MCNC: 42 MG/DL
LDLC SERPL CALC-MCNC: 120 MG/DL
NONHDLC SERPL-MCNC: 138 MG/DL
TRIGL SERPL-MCNC: 88 MG/DL

## 2024-12-10 PROCEDURE — 36415 COLL VENOUS BLD VENIPUNCTURE: CPT

## 2024-12-10 PROCEDURE — 77080 DXA BONE DENSITY AXIAL: CPT | Mod: TC | Performed by: PHYSICIAN ASSISTANT

## 2024-12-10 PROCEDURE — 80061 LIPID PANEL: CPT

## 2024-12-10 PROCEDURE — 82947 ASSAY GLUCOSE BLOOD QUANT: CPT

## 2024-12-10 NOTE — LETTER
[facility] [place]  [address] [place] [address]  [place] [address]  Phone: [phone]  Fax: [phone]      Gary JUSTINE Robinson     Date:  12/10/2024   [address]   [place] [address]      Thank you for having your DEXA scan performed.  As you recall, the DEXA scan evaluates the strength of your bones.  It is important to know the strength of your bones to help you avoid breaking bones in the future.    A T score shows your risk for breaking a bone.  Normal bone has a T score of -0.9 or higher.  Some bone thinning (osteopenia) has a T score between -1.0 and -2.4.  A lot of bone thinning (osteoporosis) has a T score of -2.5 or lower.    Your T scores on 12/10/2024 were:     Left Hip:  -2.0 score    Right Hip:  -2.2 score    This T score shows you have some thinning (Osteopenia) Compared with your previous scan, this shows no major change.    Spine:  -2.7 score      This T score shows you have a lot of thinning (Osteoporosis) Compared with your previous scan, this shows no major change.      The good news is that there are treatments for making your bones stronger, if you need them.    Ways you can make your bones stronger:    Weight bearing exercises such as walking.  Eat three servings of dairy a day, or take calcium each day (7450-0381 mg).  Take Vitamin D each day (1,000-2,000 IU).    For more information, please make an appointment with your Primary Care Provider, request a brochure from our office, or look on our website and go to the Bone Density page.       Please make an appointment with your doctor to discuss treatment options.  The scheduling phone number is [phone].    Gary, continue Vitamin D, Calcium and exercise.    Once again, similar to your last bone scan, your spine shows bone thinning in the range of OSTEOPOROSIS which puts you at a higher risk of fracture and compression fractures.  Your hips also show some thinning.  I would strongly  recommend starting a medication to help slow this bone loss process if not even reverse it and keep you safe!  Please call to set up an in person appointment to discuss these recommendations.    Thank you.    Daniela Valles MD

## 2024-12-10 NOTE — RESULT ENCOUNTER NOTE
Please inform of normal results --fasting labs all look great!  LDL is less than 130 which is our goal so keep up the good work!

## 2025-01-28 DIAGNOSIS — M81.0 OSTEOPOROSIS, SENILE: Primary | ICD-10-CM

## 2025-01-28 RX ORDER — ALENDRONATE SODIUM 70 MG/1
70 TABLET ORAL
Qty: 12 TABLET | Refills: 3 | Status: SHIPPED | OUTPATIENT
Start: 2025-01-28

## 2025-07-23 ENCOUNTER — MYC REFILL (OUTPATIENT)
Dept: OBGYN | Facility: CLINIC | Age: 60
End: 2025-07-23
Payer: COMMERCIAL

## 2025-07-23 DIAGNOSIS — M81.0 OSTEOPOROSIS, SENILE: ICD-10-CM

## 2025-07-23 RX ORDER — ALENDRONATE SODIUM 70 MG/1
70 TABLET ORAL
Qty: 12 TABLET | Refills: 3 | Status: CANCELLED | OUTPATIENT
Start: 2025-07-23

## 2025-07-24 NOTE — TELEPHONE ENCOUNTER
Requested Prescriptions   Pending Prescriptions Disp Refills    alendronate (FOSAMAX) 70 MG tablet 12 tablet 3     Sig: Take 1 tablet (70 mg) by mouth every 7 days.       Bisphosphonates Passed - 7/24/2025  9:47 AM        Passed - Dexa scan completed in the past 48-months     Please review last Dexa result.           Passed - Medication is active on med list and the sig matches. RN to manually verify dose and sig if red X/fail.     If the protocol passes (green check), you do not need to verify med dose and sig.    A prescription matches if they are the same clinical intention.    For Example: once daily and every morning are the same.    The protocol can not identify upper and lower case letters as matching and will fail.     For Example: Take 1 tablet (50 mg) by mouth daily     TAKE 1 TABLET (50 MG) BY MOUTH DAILY    For all fails (red x), verify dose and sig.    If the refill does match what is on file, the RN can still proceed to approve the refill request.       If they do not match, route to the appropriate provider.             Passed - Medication indicated for associated diagnosis     The medication is prescribed for one or more of the following conditions:     Osteoporosis   Osteitis Deformans (Paget's Disease)   Postmenopausal    Osteopenia   Arthroplasty   Crohn's Disease   Cystic Fibrosis   Fibrous Dysplasia of bone   Growth Hormone Deficiency   Hypercalcemia   Juvenile Osteoporosis   Hypogonadism          Passed - Recent (12 month) or future (90 days) visit with authorizing provider's specialty (provided they have been seen in the past 15 months)     The patient must have completed an in-person or virtual visit within the past 12 months or has a future visit scheduled within the next 90 days with the authorizing provider s specialty.  Urgent care and e-visits do not qualify as an office visit for this protocol.          Passed - Most recent Creatinine Clearance in last 12 months >35     No lab results  found.          Passed - Patient is age 18 or older           Last Written Prescription Date:  1/28/25  Last Fill Quantity: 12,  # refills: 3   Last office visit: 1/24/2025 ; last virtual visit: Visit date not found with prescribing provider:  Dr Valles   Future Office Visit:      Refused as duplicate  Bhavna Abbott RN on 7/24/2025 at 9:49 AM

## (undated) RX ORDER — FENTANYL CITRATE 50 UG/ML
INJECTION, SOLUTION INTRAMUSCULAR; INTRAVENOUS
Status: DISPENSED
Start: 2018-04-30